# Patient Record
Sex: FEMALE | Race: BLACK OR AFRICAN AMERICAN | Employment: UNEMPLOYED | URBAN - METROPOLITAN AREA
[De-identification: names, ages, dates, MRNs, and addresses within clinical notes are randomized per-mention and may not be internally consistent; named-entity substitution may affect disease eponyms.]

---
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
## 2021-02-16 ENCOUNTER — HOSPITAL ENCOUNTER (OUTPATIENT)
Age: 21
Discharge: HOME | End: 2021-02-17
Attending: OBSTETRICS & GYNECOLOGY | Admitting: OBSTETRICS & GYNECOLOGY
Payer: MEDICARE

## 2021-02-16 VITALS
TEMPERATURE: 98.5 F | HEART RATE: 92 BPM | DIASTOLIC BLOOD PRESSURE: 53 MMHG | RESPIRATION RATE: 18 BRPM | SYSTOLIC BLOOD PRESSURE: 122 MMHG

## 2021-02-16 PROBLEM — O09.93 HRP (HIGH RISK PREGNANCY), THIRD TRIMESTER: Status: ACTIVE | Noted: 2021-02-16

## 2021-02-16 LAB
BILIRUBIN URINE: NEGATIVE
COLOR: YELLOW
COMMENT UA: NORMAL
DIRECT EXAM: ABNORMAL
GLUCOSE URINE: NEGATIVE
KETONES, URINE: NEGATIVE
LEUKOCYTE ESTERASE, URINE: NEGATIVE
Lab: ABNORMAL
NITRITE, URINE: NEGATIVE
PH UA: 6.5 (ref 5–8)
PROTEIN UA: NEGATIVE
SPECIFIC GRAVITY UA: 1.02 (ref 1–1.03)
SPECIMEN DESCRIPTION: ABNORMAL
TURBIDITY: CLEAR
URINE HGB: NEGATIVE
UROBILINOGEN, URINE: NORMAL

## 2021-02-16 PROCEDURE — 81003 URINALYSIS AUTO W/O SCOPE: CPT

## 2021-02-16 PROCEDURE — 87510 GARDNER VAG DNA DIR PROBE: CPT

## 2021-02-16 PROCEDURE — 87480 CANDIDA DNA DIR PROBE: CPT

## 2021-02-16 PROCEDURE — 87660 TRICHOMONAS VAGIN DIR PROBE: CPT

## 2021-02-16 PROCEDURE — 87591 N.GONORRHOEAE DNA AMP PROB: CPT

## 2021-02-16 PROCEDURE — 87491 CHLMYD TRACH DNA AMP PROBE: CPT

## 2021-02-16 PROCEDURE — 6370000000 HC RX 637 (ALT 250 FOR IP): Performed by: STUDENT IN AN ORGANIZED HEALTH CARE EDUCATION/TRAINING PROGRAM

## 2021-02-16 RX ORDER — METRONIDAZOLE 500 MG/1
500 TABLET ORAL 2 TIMES DAILY
Qty: 14 TABLET | Refills: 0 | Status: SHIPPED | OUTPATIENT
Start: 2021-02-16 | End: 2021-02-23

## 2021-02-16 RX ORDER — ACETAMINOPHEN 500 MG
1000 TABLET ORAL ONCE
Status: COMPLETED | OUTPATIENT
Start: 2021-02-16 | End: 2021-02-16

## 2021-02-16 RX ADMIN — ACETAMINOPHEN 1000 MG: 500 TABLET ORAL at 21:14

## 2021-02-16 ASSESSMENT — PAIN SCALES - GENERAL: PAINLEVEL_OUTOF10: 5

## 2021-02-17 PROCEDURE — 99213 OFFICE O/P EST LOW 20 MIN: CPT

## 2021-02-17 NOTE — PROGRESS NOTES
Pt admitted for contractions beginning approx 2 hours ago, \"too much for me\", pants wet, denies bleeding , pain in lower abd and back

## 2021-02-17 NOTE — H&P
OBSTETRICAL HISTORY [facility]    Date: 2021       Time: 9:03 PM   Patient Name: Daniele Guzman     Patient : 2000  Room/Bed: [facility]/[place] [address]    Admission Date/Time: 2021  8:17 PM      CC: Abdominal cramping and pressure     HPI: Daniele Guzman is a 24 y.o.  at 28w3d who presents from senior living with guards for abdominal pain with pressure beginning today. Per patient she was arrested and taken to senior living today. She denies any trauma. Patient states she follows with [facility] for her Obstetrical care. Patient denies any headache, visual changes, difficulty breathing, RUQ pain, N/V, F/C, and pain/swelling in lower extremities. Denies any dysuria or vaginal discharge. The patient reports fetal movement is present, denies contractions, denies loss of fluid, denies vaginal bleeding. Pregnancy is complicated by High risk teen pregnancy, Seizures, ADHD, Marijuana use, Hx Chlamydia, and Anemia. DATING:  LMP: No LMP recorded. Patient is pregnant.   Estimated Date of Delivery: 21   Based on: early ultrasound, at 5 4/7 weeks GA    PREGNANCY RISK FACTORS:  Patient Active Problem List   Diagnosis    High risk teen pregnancy    BV     GERD     Seizures ([address]    ADHD (attention deficit hyperactivity disorder)    Marijuana use    Former smoker    HRP (high risk pregnancy)    Abdominal pain during pregnancy in second trimester    Pelvic pain affecting pregnancy in second trimester, antepartum    Young multigravida in second trimester    Chlamydia    Anemia    Abdominal pain affecting pregnancy    28 weeks gestation of pregnancy    HRP (high risk pregnancy), third trimester        Steroids Given In This Pregnancy:  no     REVIEW OF SYSTEMS:   Constitutional: negative fever, negative chills, negative weight changes   HEENT: negative visual disturbances, negative headaches, negative dizziness  Breast: negative breast abnormalities, negative breast lumps, negative nipple discharge  Respiratory: negative dyspnea, negative cough, negative SOB  Cardiovascular: negative chest pain,  negative palpitations, negative arrhythmia, negative syncope   Gastrointestinal: positive abdominal cramping, negative RUQ pain, negative N/V, negative diarrhea, negative constipation, negative bowel changes, negative heartburn   Genitourinary: negative dysuria, negative hematuria, negative urinary incontinence, negative vaginal discharge  Dermatological: negative rash, negative pruritis, negative mole changes  Hematologic: negative bruising  Immunologic/Lymphatic: negative recent illness, negative recent sick contact  Musculoskeletal: negative back pain, negative myalgias, negative arthralgias  Neurological:  negative dizziness, negative migraines, negative seizures, negative weakness  Behavior/Psych: negative depression, negative anxiety, negative SI, negative HI    OBSTETRICAL HISTORY:   OB History    Para Term  AB Living   3 2 2 0 0 2   SAB TAB Ectopic Molar Multiple Live Births   0 0 0 0 0 2      # Outcome Date GA Lbr Vito/2nd Weight Sex Delivery Anes PTL Lv   3 Current            2 Term 20 39w0d  7 lb 8 oz (3.402 kg) F Vag-Spont   SADAF   1 Term 2016    M    SADAF       PAST MEDICAL HISTORY:   has a past medical history of ADHD (attention deficit hyperactivity disorder), Allergic, and Seizures ([facility] [address] PAST SURGICAL HISTORY:   has no past surgical history on file. ALLERGIES:  is allergic to augmentin [amoxicillin-pot clavulanate]. MEDICATIONS:  Prior to Admission medications    Medication Sig Start Date End Date Taking?  Authorizing Provider   Ferrous Sulfate (FERRA PO) Take by mouth    Historical Provider, MD   folic acid (FOLVITE) 1 MG tablet Take 4 tablets by mouth daily 19  Romie Izaguirre MD   acetaminophen (APAP EXTRA STRENGTH) 500 MG tablet Take 2 tablets by mouth every 6 hours as needed for Pain or Fever 10/29/19   Elba Kan Farzaneh Duval MD   ondansetron (ZOFRAN ODT) 4 MG disintegrating tablet Take 1 tablet by mouth every 8 hours as needed for Nausea 10/29/19   Agustin Mejia MD   Oral Electrolytes (PEDIALYTE) SOLN Take 1,000 mLs by mouth daily 10/12/18   Keny Pickett MD   famotidine (PEPCID) 20 MG tablet Take 1 tablet by mouth 2 times daily 1/25/16   See-Yin So, DO       FAMILY HISTORY:  family history includes Diabetes in her maternal grandfather; High Blood Pressure in her maternal grandmother; Stroke in her maternal grandmother. SOCIAL HISTORY:   reports that she has quit smoking. She has never used smokeless tobacco. She reports previous alcohol use. She reports previous drug use.     VITALS:  Vitals:    02/16/21 2036   BP: (!) 122/53   Pulse: 92   Resp: 18   Temp: 98.5 °F (36.9 °C)   TempSrc: Oral       PHYSICAL EXAM:  Fetal Heart Monitor:  Baseline Heart Rate 135, moderate variability, present accelerations, absent decelerations  [place]: contractions, irregular and rare     General appearance:  no apparent distress, alert and cooperative  HEENT: head atraumatic, normocephalic, trachea midline, moist mucous membranes   Neurologic:  oriented, normal speech, no focal findings or movement disorder noted  Lungs:  no increased work of breathing, good air exchange, clear to auscultation bilaterally, no crackles or wheezing  Heart:  regular rate and rhythm   Abdomen:  soft, gravid, non-tender on palpation, no right upper quadrant tenderness, no CVA tenderness bilaterally, uterus non-tender, no signs of abruption and no signs of chorioamnionitis  Extremities:  no calf tenderness bilaterally, non-edematous bilaterally, DTRs: normal    Musculoskeletal: no gross abnormalities, range of motion appropriate for age   Psychiatric: mood appropriate, normal affect   Pelvic Exam:   Vulva: normal appearing vulva, no masses, tenderness or lesions, normal clitoris    Vagina: normal appearing urethral meatus, normal appearing vaginal mucosa with normal color and discharge, no lesions, no vaginal bleeding     Cervix: multiparous appearing cervix without pathologic appearing discharge or lesions, cervix thick and visibly closed, no cervical motion tenderness   Uterus: is gravid, normal size, shape, consistency and non-tender    Adnexa: non-tender, no palpable masses   Rectal Exam: not indicated     Cervix Check: 1 cm dilated, thick, high soft    PRENATAL LAB RESULTS:   Blood Type/Rh: A pos  No prenatal care labs available in EMR. ASSESSMENT & PLAN:  Aroldo Monsivais is a 24 y.o. female  at 28w3d IUP   - GBS unknown / Rh positive / R unknown   - No indication for GBS prophylaxis unless delivery imminent    - VSS    Abdominal Cramping/Pelvic pressure   - Patient reports abdominal cramping and pelvic pressure beginning earlier today. Of note patient was arrested today and presents to triage with two officers   - Vaginitis: + Bacterial vaginosis    - Rx given for flagyl BID    - UA: negative   - Gonorrhea/Chlamydia pending    - SSE: No external lesions or erythema, normal appearing vaginal mucosa that is pink and moist, no blood noted in the vaginal canal or at the cervical os, small amount of thin physiologic discharge in vault, cervix appears multiparous and long without lesions or erythema   - SVE: 1cm, thick, high    - Low suspicion for  labor given reassuring exam. Patient will be discharged home. Patient counseled on  labor precautions, pre-eclampsia signs and symptoms, PO hydration, and fetal kick counts. All questions and concerns were addressed and patient expressed understanding. Patient advised to follow up with her Ob/Gyn as soon as able given her current social situation. Limited PNC    - No prenatal labs visible in EMR   - Dating US 20 @ 5w 4d    - No follow up since.  Patient states she is seen at [facility] but was not able to find records to support that     Seizures   - Patient denies current medications   -

## 2021-02-17 NOTE — PROGRESS NOTES
Requests made multiple times for pt to provide urine sample to aid with diagnosis, pt appears to be sleeping whenever RN walks in room, pt has stated she doesn't \"want to go back\" to [facility], water was given, pt so far has declined to drink more than a few strips, explained to pt importance of providing urine sample

## 2021-08-18 ENCOUNTER — HOSPITAL ENCOUNTER (EMERGENCY)
Age: 21
Discharge: LEFT BEFORE DISCHARGE | End: 2021-08-18
Attending: EMERGENCY MEDICINE
Payer: MEDICARE

## 2021-08-18 VITALS
HEART RATE: 81 BPM | TEMPERATURE: 98.2 F | DIASTOLIC BLOOD PRESSURE: 65 MMHG | WEIGHT: 145 LBS | HEIGHT: 63 IN | RESPIRATION RATE: 18 BRPM | BODY MASS INDEX: 25.69 KG/M2 | OXYGEN SATURATION: 100 % | SYSTOLIC BLOOD PRESSURE: 123 MMHG

## 2021-08-18 DIAGNOSIS — R10.9 FLANK PAIN: Primary | ICD-10-CM

## 2021-08-18 PROCEDURE — 99283 EMERGENCY DEPT VISIT LOW MDM: CPT

## 2021-08-18 ASSESSMENT — PAIN SCALES - GENERAL: PAINLEVEL_OUTOF10: 7

## 2021-08-18 NOTE — ED NOTES
RN into room again to wake pt up. Pt moves in bed and rolls over. Pt still not responding to RN.       Vicki Meneses RN  08/18/21 0255       Vicki Meneses RN  08/18/21 0677

## 2021-08-18 NOTE — ED NOTES
RN into room to collect urine sample. Pt snoring in bed. No urine sample in room. Rn attempts to wake pt up. Pt will not respond to RN.      [facility]  08/18/21 1531

## 2021-08-18 NOTE — ED PROVIDER NOTES
EMERGENCY DEPARTMENT ENCOUNTER    Pt Name: Tobin Early  MRN: [id number]  [place] 2000  Date of evaluation: 8/18/21  CHIEF COMPLAINT       Chief Complaint   Patient presents with    Flank Pain     bilateral      HISTORY OF PRESENT ILLNESS   Patient is a 50-year-old female who presents ED for evaluation of bilateral flank pain. Patient unable to give provide history as she is sleeping in exam bed. REVIEW OF SYSTEMS     Review of Systems   Unable to perform ROS: Other     PASTMEDICAL HISTORY     Past Medical History:   Diagnosis Date    ADHD (attention deficit hyperactivity disorder)     Allergic     Seizures ([facility] [address]      SURGICAL HISTORY     History reviewed. No pertinent surgical history. CURRENT MEDICATIONS       Discharge Medication List as of 8/18/2021  3:04 AM      CONTINUE these medications which have NOT CHANGED    Details   Ferrous Sulfate (FERRA PO) Take by mouthHistorical Med      folic acid (FOLVITE) 1 MG tablet Take 4 tablets by mouth daily, Disp-120 tablet, R-0Print      acetaminophen (APAP EXTRA STRENGTH) 500 MG tablet Take 2 tablets by mouth every 6 hours as needed for Pain or Fever, Disp-60 tablet, R-0Print      ondansetron (ZOFRAN ODT) 4 MG disintegrating tablet Take 1 tablet by mouth every 8 hours as needed for Nausea, Disp-10 tablet, R-0Print      Oral Electrolytes (PEDIALYTE) SOLN Take 1,000 mLs by mouth daily, Disp-2000 mL, R-3Print      famotidine (PEPCID) 20 MG tablet Take 1 tablet by mouth 2 times daily, Disp-60 tablet, R-0Print           ALLERGIES     is allergic to augmentin [amoxicillin-pot clavulanate]. FAMILY HISTORY     She indicated that the status of her maternal grandmother is unknown. She indicated that the status of her maternal grandfather is unknown.      SOCIAL HISTORY       Social History     Tobacco Use    Smoking status: Former Smoker    Smokeless tobacco: Never Used    Tobacco comment: off and on   Vaping Use    Vaping Use: Never used   Substance Use Topics  Alcohol use: Not Currently     Comment: Socially    Drug use: Not Currently     Comment: quit 10/28/2019     PHYSICAL EXAM     INITIAL VITALS: /65   Pulse 81   Temp 98.2 °F (36.8 °C) (Oral)   Resp 18   Ht 5' 3\" (1.6 m)   Wt 145 lb (65.8 kg)   SpO2 100%   BMI 25.69 kg/m²    Physical Exam  Constitutional:       Comments: Patient sleeping in exam bed. After waking up, she glances at me, and turns her body away from and falls back asleep. HENT:      Head: Normocephalic. Right Ear: External ear normal.      Left Ear: External ear normal.      Nose: Nose normal.   Eyes:      Conjunctiva/sclera: Conjunctivae normal.   Cardiovascular:      Rate and Rhythm: Normal rate. Pulmonary:      Effort: Pulmonary effort is normal.   Abdominal:      General: Abdomen is flat. Skin:     General: Skin is dry. Neurological:      Mental Status: She is alert. Mental status is at baseline. Psychiatric:         Mood and Affect: Mood normal.         Behavior: Behavior normal.         MEDICAL DECISION MAKING:   The patient is hemodynamically stable, afebrile, nontoxic-appearing. Physical exam unremarkable. Based on history and exam unclear etiology of symptoms. ED plan for UA, reassess. DIAGNOSTIC RESULTS   EKG:All EKG's are interpreted by the Emergency Department Physician who either signs or Co-signs this chart in the absence of a cardiologist.        RADIOLOGY:All plain film, CT, MRI, and formal ultrasound images (except ED bedside ultrasound) are read by the radiologist, see reports below, unless otherwisenoted in MDM or here. No orders to display     LABS: All lab results were reviewed by myself, and all abnormals are listed below. Labs Reviewed   URINE RT REFLEX TO CULTURE   POCT URINE PREGNANCY       EMERGENCY DEPARTMENTCOURSE:   Patient continued to sleep in exam bed and did not offer explanation of why she was here or what work-up she would like.   Nurse tried to obtain urine sample from her

## 2021-08-18 NOTE — ED NOTES
Pt sleeping in room. Pt refusing to talk to Dr. Priscilla Allison when Dr. Priscilla Allison entered room. RN into room, explains that pt need to talk to the doctor to be evaluated. Pt nods head. RN explains pt needs a urine sample, pt nods her head.       Lindsey Santo RN  08/18/21 3980       Lindsey Santo RN  08/18/21 3761

## 2021-10-03 ENCOUNTER — HOSPITAL ENCOUNTER (EMERGENCY)
Age: 21
Discharge: HOME | End: 2021-10-03
Attending: EMERGENCY MEDICINE
Payer: MEDICARE

## 2021-10-03 VITALS
RESPIRATION RATE: 16 BRPM | DIASTOLIC BLOOD PRESSURE: 62 MMHG | SYSTOLIC BLOOD PRESSURE: 114 MMHG | OXYGEN SATURATION: 99 % | BODY MASS INDEX: 26.68 KG/M2 | HEART RATE: 56 BPM | TEMPERATURE: 98.2 F | HEIGHT: 62 IN | WEIGHT: 145 LBS

## 2021-10-03 DIAGNOSIS — Z71.1 NO PROBLEM, FEARED COMPLAINT UNFOUNDED: Primary | ICD-10-CM

## 2021-10-03 PROCEDURE — 81025 URINE PREGNANCY TEST: CPT

## 2021-10-03 PROCEDURE — 99282 EMERGENCY DEPT VISIT SF MDM: CPT

## 2021-10-03 ASSESSMENT — ENCOUNTER SYMPTOMS
CHEST TIGHTNESS: 0
FACIAL SWELLING: 0
BACK PAIN: 0
ABDOMINAL PAIN: 0
EYE PAIN: 0
SHORTNESS OF BREATH: 0
ABDOMINAL DISTENTION: 0
EYE DISCHARGE: 0

## 2021-10-03 NOTE — ED PROVIDER NOTES
EMERGENCY DEPARTMENT ENCOUNTER    Pt Name: Malinda Campbell  MRN: [id number]  [place] 2000  Date of evaluation: 10/3/21  CHIEF COMPLAINT     No chief complaint on file. HISTORY OF PRESENT ILLNESS   HPI   The patient is a 27-year-old female who presented to the emergency department requesting a pregnancy test.  Patient denied abdominal pain, vaginal bleeding or spotting. She is 2 days late for her menstrual period. Patient denies chest pain, shortness of breath, nausea, vomiting, fevers or chills    REVIEW OF SYSTEMS     Review of Systems   Constitutional: Negative for chills, diaphoresis and fever. HENT: Negative for congestion, ear pain and facial swelling. Eyes: Negative for pain, discharge and visual disturbance. Respiratory: Negative for chest tightness and shortness of breath. Cardiovascular: Negative for chest pain and palpitations. Gastrointestinal: Negative for abdominal distention and abdominal pain. Genitourinary: Negative for difficulty urinating and flank pain. Musculoskeletal: Negative for back pain. Skin: Negative for wound. Neurological: Negative for dizziness, light-headedness and headaches. PASTMEDICAL HISTORY     Past Medical History:   Diagnosis Date    ADHD (attention deficit hyperactivity disorder)     Allergic     Seizures ([facility] [address]      SURGICAL HISTORY     No past surgical history on file.   CURRENT MEDICATIONS       Previous Medications    ACETAMINOPHEN (APAP EXTRA STRENGTH) 500 MG TABLET    Take 2 tablets by mouth every 6 hours as needed for Pain or Fever    FAMOTIDINE (PEPCID) 20 MG TABLET    Take 1 tablet by mouth 2 times daily    FERROUS SULFATE (FERRA PO)    Take by mouth    FOLIC ACID (FOLVITE) 1 MG TABLET    Take 4 tablets by mouth daily    ONDANSETRON (ZOFRAN ODT) 4 MG DISINTEGRATING TABLET    Take 1 tablet by mouth every 8 hours as needed for Nausea    ORAL ELECTROLYTES (PEDIALYTE) SOLN    Take 1,000 mLs by mouth daily     ALLERGIES     is allergic to augmentin [amoxicillin-pot clavulanate]. FAMILY HISTORY     She indicated that the status of her maternal grandmother is unknown. She indicated that the status of her maternal grandfather is unknown. SOCIAL HISTORY       Social History     Tobacco Use    Smoking status: Former Smoker    Smokeless tobacco: Never Used    Tobacco comment: off and on   Vaping Use    Vaping Use: Never used   Substance Use Topics    Alcohol use: Not Currently     Comment: Socially    Drug use: Not Currently     Comment: quit 10/28/2019     PHYSICAL EXAM     INITIAL VITALS: /62   Pulse 56   Temp 98.2 °F (36.8 °C) (Oral)   Resp 16   Ht 5' 2\" (1.575 m)   Wt 145 lb (65.8 kg)   LMP 09/21/2021   SpO2 99%   BMI 26.52 kg/m²    Physical Exam  Vitals and nursing note reviewed. Constitutional:       General: She is not in acute distress. Appearance: She is well-developed. She is not diaphoretic. HENT:      Head: Normocephalic and atraumatic. Eyes:      Pupils: Pupils are equal, round, and reactive to light. Cardiovascular:      Rate and Rhythm: Normal rate and regular rhythm. Pulmonary:      Effort: Pulmonary effort is normal.      Breath sounds: Normal breath sounds. Abdominal:      General: Bowel sounds are normal.      Palpations: Abdomen is soft. Musculoskeletal:         General: Normal range of motion. Cervical back: Normal range of motion and neck supple. Skin:     General: Skin is warm. Capillary Refill: Capillary refill takes less than 2 seconds. Neurological:      Mental Status: She is alert and oriented to person, place, and time. MEDICAL DECISION MAKING:   Patient is a 45-year-old female who presented to the emergency department requesting a pregnancy test.  Patient had no abdominal pain vaginal bleeding or other complaints. Urine pregnancy test obtained and negative. Results discussed with patient.   Patient is discharged home with outpatient follow-up and given parameters to return to the emergency department. All patient's question's and concerns were answered prior to disposition and patient and/or family expressed understanding and agreement of treatment plan. CRITICAL CARE:              NIH STROKE SCALE:            PROCEDURES:    Procedures    DIAGNOSTIC RESULTS   EKG:All EKG's are interpreted by the Emergency Department Physician who either signs or Co-signs this chart in the absence of a cardiologist.        RADIOLOGY:All plain film, CT, MRI, and formal ultrasound images (except ED bedside ultrasound) are read by the radiologist, see reports below, unless otherwisenoted in MDM or here. No orders to display     LABS: All lab results were reviewed by myself, and all abnormals are listed below. Labs Reviewed - No data to display    EMERGENCY DEPARTMENTCOURSE:         Vitals:    Vitals:    10/03/21 1650   BP: 114/62   Pulse: 56   Resp: 16   Temp: 98.2 °F (36.8 °C)   TempSrc: Oral   SpO2: 99%   Weight: 145 lb (65.8 kg)   Height: 5' 2\" (1.575 m)       The patient was given the following medications while in the emergency department:  No orders of the defined types were placed in this encounter. CONSULTS:  None    FINAL IMPRESSION      1. No problem, feared complaint unfounded          DISPOSITION/PLAN   DISPOSITION        PATIENT REFERRED TO:    Please call [phone] to establish care with a primary care physician. DISCHARGE MEDICATIONS:  New Prescriptions    No medications on file     Claudio Carranza MD  Attending Emergency Physician      The care is provided during an unprecedented national emergency due to the novel coronavirus, COVID 19. This note was created with the assistance of a speech-recognition program. While intending to generate a document that actually reflects the content of the visit, no guarantees can be provided that every mistake has been identified and corrected by editing.     Wayne Louis MD  63/85/41

## 2021-10-04 LAB — HCG, PREGNANCY URINE (POC): NEGATIVE

## 2021-10-16 ENCOUNTER — HOSPITAL ENCOUNTER (EMERGENCY)
Age: 21
Discharge: HOME | End: 2021-10-16
Attending: EMERGENCY MEDICINE
Payer: MEDICARE

## 2021-10-16 ENCOUNTER — APPOINTMENT (OUTPATIENT)
Dept: GENERAL RADIOLOGY | Age: 21
End: 2021-10-16
Payer: MEDICARE

## 2021-10-16 VITALS
SYSTOLIC BLOOD PRESSURE: 111 MMHG | BODY MASS INDEX: 25.69 KG/M2 | OXYGEN SATURATION: 99 % | RESPIRATION RATE: 18 BRPM | TEMPERATURE: 97.3 F | HEART RATE: 70 BPM | DIASTOLIC BLOOD PRESSURE: 74 MMHG | HEIGHT: 63 IN

## 2021-10-16 DIAGNOSIS — R68.84 JAW PAIN: Primary | ICD-10-CM

## 2021-10-16 PROCEDURE — 6370000000 HC RX 637 (ALT 250 FOR IP): Performed by: EMERGENCY MEDICINE

## 2021-10-16 PROCEDURE — 99283 EMERGENCY DEPT VISIT LOW MDM: CPT

## 2021-10-16 PROCEDURE — 70100 X-RAY EXAM OF JAW <4VIEWS: CPT

## 2021-10-16 RX ORDER — IBUPROFEN 400 MG/1
600 TABLET ORAL ONCE
Status: COMPLETED | OUTPATIENT
Start: 2021-10-16 | End: 2021-10-16

## 2021-10-16 RX ADMIN — IBUPROFEN 600 MG: 400 TABLET, FILM COATED ORAL at 11:40

## 2021-10-16 ASSESSMENT — ENCOUNTER SYMPTOMS
SHORTNESS OF BREATH: 0
COUGH: 0
VOMITING: 0
ABDOMINAL PAIN: 0
NAUSEA: 0
BACK PAIN: 0

## 2021-10-16 ASSESSMENT — PAIN SCALES - GENERAL: PAINLEVEL_OUTOF10: 10
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
## 2021-10-16 NOTE — PROGRESS NOTES
This patient was assigned to me by error. This patient was never interviewed nor examined by me. I did not participate in the care of this patient.     Kelly Hightower MD  Emergency Medicine Resident
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
## 2021-10-22 ENCOUNTER — HOSPITAL ENCOUNTER (EMERGENCY)
Age: 21
Discharge: HOME | End: 2021-10-22
Attending: EMERGENCY MEDICINE
Payer: MEDICARE

## 2021-10-22 ENCOUNTER — APPOINTMENT (OUTPATIENT)
Dept: GENERAL RADIOLOGY | Age: 21
End: 2021-10-22
Payer: MEDICARE

## 2021-10-22 VITALS
BODY MASS INDEX: 24.8 KG/M2 | TEMPERATURE: 98.2 F | SYSTOLIC BLOOD PRESSURE: 118 MMHG | HEART RATE: 78 BPM | DIASTOLIC BLOOD PRESSURE: 78 MMHG | RESPIRATION RATE: 18 BRPM | WEIGHT: 140 LBS | OXYGEN SATURATION: 99 %

## 2021-10-22 DIAGNOSIS — S93.401A SPRAIN OF RIGHT ANKLE, UNSPECIFIED LIGAMENT, INITIAL ENCOUNTER: Primary | ICD-10-CM

## 2021-10-22 PROCEDURE — 73630 X-RAY EXAM OF FOOT: CPT

## 2021-10-22 PROCEDURE — 73590 X-RAY EXAM OF LOWER LEG: CPT

## 2021-10-22 PROCEDURE — 6360000002 HC RX W HCPCS: Performed by: STUDENT IN AN ORGANIZED HEALTH CARE EDUCATION/TRAINING PROGRAM

## 2021-10-22 PROCEDURE — 99282 EMERGENCY DEPT VISIT SF MDM: CPT

## 2021-10-22 PROCEDURE — 96372 THER/PROPH/DIAG INJ SC/IM: CPT

## 2021-10-22 PROCEDURE — 73610 X-RAY EXAM OF ANKLE: CPT

## 2021-10-22 RX ORDER — IBUPROFEN 200 MG
600 TABLET ORAL EVERY 6 HOURS PRN
Qty: 30 TABLET | Refills: 0 | Status: SHIPPED | OUTPATIENT
Start: 2021-10-22 | End: 2022-01-28 | Stop reason: ALTCHOICE

## 2021-10-22 RX ORDER — KETOROLAC TROMETHAMINE 30 MG/ML
30 INJECTION, SOLUTION INTRAMUSCULAR; INTRAVENOUS ONCE
Status: COMPLETED | OUTPATIENT
Start: 2021-10-22 | End: 2021-10-22

## 2021-10-22 RX ADMIN — KETOROLAC TROMETHAMINE 30 MG: 30 INJECTION, SOLUTION INTRAMUSCULAR at 19:43

## 2021-10-22 ASSESSMENT — ENCOUNTER SYMPTOMS
DIARRHEA: 0
VOMITING: 0
SORE THROAT: 0
NAUSEA: 0
SHORTNESS OF BREATH: 0
CONSTIPATION: 0
TROUBLE SWALLOWING: 0
ABDOMINAL PAIN: 0

## 2021-10-22 ASSESSMENT — PAIN SCALES - GENERAL
PAINLEVEL_OUTOF10: 10
PAINLEVEL_OUTOF10: 10

## 2021-10-22 NOTE — ED NOTES
Pt presents to the ed via private auto c/o right ankle pain that started today after she tripped over a curb. Upon arrival to the ed no deformities edema noted to right ankle pt rates her pain 10/10.      Marley White RN  10/22/21 0935

## 2021-10-22 NOTE — ED PROVIDER NOTES
[facility]  Emergency Department  Faculty Attestation     I performed a history and physical examination of the patient and discussed management with the resident. I reviewed the residents note and agree with the documented findings and plan of care. Any areas of disagreement are noted on the chart. I was personally present for the key portions of any procedures. I have documented in the chart those procedures where I was not present during the key portions. I have reviewed the emergency nurses triage note. I agree with the chief complaint, past medical history, past surgical history, allergies, medications, social and family history as documented unless otherwise noted below. For Physician Assistant/ Nurse Practitioner cases/documentation I have personally evaluated this patient and have completed at least one if not all key elements of the E/M (history, physical exam, and MDM). Additional findings are as noted. Primary Care Physician:  No primary care provider on file. Screenings:  [unfilled]    CHIEF COMPLAINT       Chief Complaint   Patient presents with    Ankle Pain     pt twisted R ankle while running; approx 45 min PTA       RECENT VITALS:   Temp: 98.2 °F (36.8 °C),  Pulse: 78, Resp: 18, BP: 118/78    LABS:  Labs Reviewed - No data to display    Radiology  XR ANKLE RIGHT (MIN 3 VIEWS)    (Results Pending)   XR FOOT RIGHT (MIN 3 VIEWS)    (Results Pending)         Attending Physician Additional  Notes    She has severe right ankle pain after an inversion injury. She also has pain in the proximal fibula. No injuries elsewhere. No laceration or abrasion. On exam she is in moderate distress secondary pain, vital signs normal.  There is minimal tenderness in the proximal fibula. Compartments are soft. Normal sensation light touch. She is able to wiggle her toes. No medial malleoli or tenderness.   She has significant lateral malleoli tenderness as well as swelling, mild tenderness over the ATFL, no tenderness over the proximal fifth metatarsal.  Impression is ankle sprain rule out fracture, rule out proximal fibular fracture. Plan is imaging ice elevation splinting crutches analgesics and follow-up. Dominique Leon.  Tommie Galvez MD, [facility]  Attending Emergency  Physician               Mary Braun MD  10/22/21 9077

## 2021-10-23 NOTE — ED PROVIDER NOTES
[facility] ED  Emergency Department Encounter  Emergency Medicine Resident     Pt Kylee Roper  MRN: [id number]  [place] 2000  Date of evaluation: 10/22/21  PCP:  No primary care provider on file. CHIEF COMPLAINT       Chief Complaint   Patient presents with    Ankle Pain     pt twisted R ankle while running; approx 45 min PTA       HISTORY OF PRESENT ILLNESS  (Location/Symptom, Timing/Onset, Context/Setting, Quality, Duration, Modifying Factors, Severity.)      Kristine Bowman is a 22 y.o. female who presents with acute onset right ankle pain and swelling that started after rolling her ankle stepping off of a curb and slipping. Did not strike her head no loss of consciousness. Unable to bear weight after the injury, was carried to a vehicle and transported to the emergency department. No numbness tingling or weakness but significant pain when attempting to ambulate. No disruption of skin. PAST MEDICAL / SURGICAL / SOCIAL / FAMILY HISTORY      has a past medical history of ADHD (attention deficit hyperactivity disorder), Allergic, and Seizures ([facility] [address] has no past surgical history on file. No pertinent surgical history on review with patient/family.     Social History     Socioeconomic History    Marital status: Single     Spouse name: Not on file    Number of children: 1    Years of education: Not on file    Highest education level: Not on file   Occupational History    Not on file   Tobacco Use    Smoking status: Former Smoker    Smokeless tobacco: Never Used    Tobacco comment: off and on   Vaping Use    Vaping Use: Never used   Substance and Sexual Activity    Alcohol use: Not Currently     Comment: Socially    Drug use: Not Currently     Comment: quit 10/28/2019    Sexual activity: Yes     Partners: Male   Other Topics Concern    Not on file   Social History Narrative    Not on file     Social Determinants of Health     Financial Resource Strain:    Meena Difficulty of Paying Living Expenses:    Food Insecurity:     Worried About Running Out of Food in the Last Year:     [address] in the Last Year:    Transportation Needs:     Lack of Transportation (Medical):  Lack of Transportation (Non-Medical):    Physical Activity:     Days of Exercise per Week:     Minutes of Exercise per Session:    Stress:     Feeling of Stress :    Social Connections:     Frequency of Communication with Friends and Family:     Frequency of Social Gatherings with Friends and Family:     Attends [employer] Services:     Active Member of Clubs or Organizations:     Attends Club or Organization Meetings:     Marital Status:    Intimate Partner Violence:     Fear of Current or Ex-Partner:     Emotionally Abused:     Physically Abused:     Sexually Abused:        Family History   Problem Relation Age of Onset    Stroke Maternal Grandmother     High Blood Pressure Maternal Grandmother     Diabetes Maternal Grandfather        Allergies:  Augmentin [amoxicillin-pot clavulanate]    Home Medications:  Prior to Admission medications    Medication Sig Start Date End Date Taking?  Authorizing Provider   ibuprofen (ADVIL;MOTRIN) 200 MG tablet Take 3 tablets by mouth every 6 hours as needed for Pain or Fever 10/22/21  Yes Hannah De La Garza MD   Ferrous Sulfate (FERRA PO) Take by mouth    Historical Provider, MD   folic acid (FOLVITE) 1 MG tablet Take 4 tablets by mouth daily 11/23/19 1/25/20  Addy Guerrero MD   acetaminophen (APAP EXTRA STRENGTH) 500 MG tablet Take 2 tablets by mouth every 6 hours as needed for Pain or Fever 10/29/19   Andrea Escalante MD   ondansetron (ZOFRAN ODT) 4 MG disintegrating tablet Take 1 tablet by mouth every 8 hours as needed for Nausea 10/29/19   Andrea Escalante MD   Oral Electrolytes (PEDIALYTE) SOLN Take 1,000 mLs by mouth daily 10/12/18   Phi James MD   famotidine (PEPCID) 20 MG tablet Take 1 tablet by mouth 2 times daily 1/25/16 See-Yin So, DO       REVIEW OF SYSTEMS    (2-9 systems for level 4, 10 or more for level 5)      Review of Systems   Constitutional: Negative for chills and fever. HENT: Negative for sore throat and trouble swallowing. Respiratory: Negative for shortness of breath. Cardiovascular: Negative for chest pain. Gastrointestinal: Negative for abdominal pain, constipation, diarrhea, nausea and vomiting. Genitourinary: Negative for dysuria and vaginal discharge. Musculoskeletal: Positive for arthralgias, gait problem, joint swelling and myalgias. Skin: Negative for wound. Neurological: Negative for light-headedness and headaches. Psychiatric/Behavioral: Negative for behavioral problems. PHYSICAL EXAM   (up to 7 for level 4, 8 or more for level 5)      INITIAL VITALS:   /78   Pulse 78   Temp 98.2 °F (36.8 °C)   Resp 18   Wt 140 lb (63.5 kg)   SpO2 99%   BMI 24.80 kg/m²     Physical Exam  Vitals and nursing note reviewed. Constitutional:       General: She is not in acute distress. Appearance: Normal appearance. She is well-developed. She is not diaphoretic. HENT:      Head: Normocephalic and atraumatic. Right Ear: External ear normal.      Left Ear: External ear normal.      Nose: Nose normal.      Mouth/Throat:      Pharynx: Uvula midline. Cardiovascular:      Rate and Rhythm: Normal rate. Pulmonary:      Effort: Pulmonary effort is normal. No respiratory distress. Abdominal:      Palpations: Abdomen is soft. Tenderness: There is no abdominal tenderness. Musculoskeletal:      Cervical back: Normal range of motion. Comments: Significant swelling of the right lateral malleolus with point tenderness over the lateral malleolus, head of the fifth metatarsal.  Brisk cap refill with pulses motor sensation intact distal to injury. Skin:     General: Skin is warm and dry. Capillary Refill: Capillary refill takes less than 2 seconds.    Neurological: General: No focal deficit present. Mental Status: She is alert and oriented to person, place, and time. Sensory: No sensory deficit. Psychiatric:         Mood and Affect: Mood normal.         Behavior: Behavior normal.         DIFFERENTIAL  DIAGNOSIS     PLAN (LABS / IMAGING / EKG):  Orders Placed This Encounter   Procedures    XR ANKLE RIGHT (MIN 3 VIEWS)    XR FOOT RIGHT (MIN 3 VIEWS)    XR TIBIA FIBULA RIGHT (2 VIEWS)    [employer] Crutches; Pair, Right Side Injury; Med (5'2\"-5'10\"); Ankle Brace, Right       MEDICATIONS ORDERED:  Orders Placed This Encounter   Medications    ketorolac (TORADOL) injection 30 mg    ibuprofen (ADVIL;MOTRIN) 200 MG tablet     Sig: Take 3 tablets by mouth every 6 hours as needed for Pain or Fever     Dispense:  30 tablet     Refill:  0       DDX: Sprain versus strain versus fracture versus dislocation versus other    DIAGNOSTIC RESULTS / EMERGENCY DEPARTMENT COURSE / MDM     LABS:  No results found for this visit on 10/22/21. RADIOLOGY:  XR TIBIA FIBULA RIGHT (2 VIEWS)    Result Date: 10/22/2021  EXAMINATION: THREE XRAY VIEWS OF THE RIGHT FOOT; 2  XRAY VIEWS OF THE RIGHT TIBIA AND FIBULA; THREE XRAY VIEWS OF THE RIGHT ANKLE 10/22/2021 2:07 pm COMPARISON: None. HISTORY: ORDERING SYSTEM PROVIDED HISTORY: head 5th MT tenderness, lateral swelling TECHNOLOGIST PROVIDED HISTORY: head 5th MT tenderness, lateral swelling Acuity: Acute Type of Exam: Initial; ORDERING SYSTEM PROVIDED HISTORY: proximal tenderness TECHNOLOGIST PROVIDED HISTORY: proximal tenderness Acuity: Acute Type of Exam: Initial; ORDERING SYSTEM PROVIDED HISTORY: lateral mal tenderness, rolled off curb TECHNOLOGIST PROVIDED HISTORY: lateral mal tenderness, rolled off curb Acuity: Acute Type of Exam: Initial FINDINGS: Two views of the right lower leg demonstrate no evidence of an acute fracture or traumatic malalignment. No foreign bodies are noted.  Three views of the right ankle demonstrate a mild degree of soft tissue swelling adjacent to the lateral malleolus. No evidence of an acute fracture is present. The ankle mortise is intact. No foreign bodies are noted. Three views of the right foot are compared to a prior film series from August 14, 2019. No evidence of an acute fracture or traumatic malalignment is present. No foreign bodies are noted. Minimal osteoarthritic changes present involving the calcaneal cuboid joint. 1. Soft tissue swelling lateral right ankle, without evidence of an acute fracture or traumatic malalignment 2. No acute osseous abnormality involving the right lower leg, or right foot     XR ANKLE RIGHT (MIN 3 VIEWS)    Result Date: 10/22/2021  EXAMINATION: THREE XRAY VIEWS OF THE RIGHT FOOT; 2  XRAY VIEWS OF THE RIGHT TIBIA AND FIBULA; THREE XRAY VIEWS OF THE RIGHT ANKLE 10/22/2021 2:07 pm COMPARISON: None. HISTORY: ORDERING SYSTEM PROVIDED HISTORY: head 5th MT tenderness, lateral swelling TECHNOLOGIST PROVIDED HISTORY: head 5th MT tenderness, lateral swelling Acuity: Acute Type of Exam: Initial; ORDERING SYSTEM PROVIDED HISTORY: proximal tenderness TECHNOLOGIST PROVIDED HISTORY: proximal tenderness Acuity: Acute Type of Exam: Initial; ORDERING SYSTEM PROVIDED HISTORY: lateral mal tenderness, rolled off curb TECHNOLOGIST PROVIDED HISTORY: lateral mal tenderness, rolled off curb Acuity: Acute Type of Exam: Initial FINDINGS: Two views of the right lower leg demonstrate no evidence of an acute fracture or traumatic malalignment. No foreign bodies are noted. Three views of the right ankle demonstrate a mild degree of soft tissue swelling adjacent to the lateral malleolus. No evidence of an acute fracture is present. The ankle mortise is intact. No foreign bodies are noted. Three views of the right foot are compared to a prior film series from August 14, 2019. No evidence of an acute fracture or traumatic malalignment is present. No foreign bodies are noted. Minimal osteoarthritic changes present involving the calcaneal cuboid joint. 1. Soft tissue swelling lateral right ankle, without evidence of an acute fracture or traumatic malalignment 2. No acute osseous abnormality involving the right lower leg, or right foot     XR FOOT RIGHT (MIN 3 VIEWS)    Result Date: 10/22/2021  EXAMINATION: THREE XRAY VIEWS OF THE RIGHT FOOT; 2  XRAY VIEWS OF THE RIGHT TIBIA AND FIBULA; THREE XRAY VIEWS OF THE RIGHT ANKLE 10/22/2021 2:07 pm COMPARISON: None. HISTORY: ORDERING SYSTEM PROVIDED HISTORY: head 5th MT tenderness, lateral swelling TECHNOLOGIST PROVIDED HISTORY: head 5th MT tenderness, lateral swelling Acuity: Acute Type of Exam: Initial; ORDERING SYSTEM PROVIDED HISTORY: proximal tenderness TECHNOLOGIST PROVIDED HISTORY: proximal tenderness Acuity: Acute Type of Exam: Initial; ORDERING SYSTEM PROVIDED HISTORY: lateral mal tenderness, rolled off curb TECHNOLOGIST PROVIDED HISTORY: lateral mal tenderness, rolled off curb Acuity: Acute Type of Exam: Initial FINDINGS: Two views of the right lower leg demonstrate no evidence of an acute fracture or traumatic malalignment. No foreign bodies are noted. Three views of the right ankle demonstrate a mild degree of soft tissue swelling adjacent to the lateral malleolus. No evidence of an acute fracture is present. The ankle mortise is intact. No foreign bodies are noted. Three views of the right foot are compared to a prior film series from August 14, 2019. No evidence of an acute fracture or traumatic malalignment is present. No foreign bodies are noted. Minimal osteoarthritic changes present involving the calcaneal cuboid joint. 1. Soft tissue swelling lateral right ankle, without evidence of an acute fracture or traumatic malalignment 2.  No acute osseous abnormality involving the right lower leg, or right foot       EKG  None    All EKG's are interpreted by the Emergency Department Physician who either signs or Co-signs this chart in the absence of a cardiologist.    EMERGENCY DEPARTMENT COURSE:  Patient found seated upright in bed, uncomfortable appearing but no acute distress. Stable vitals. X-rays without acute fracture. Neurovascularly intact. Consistent with severe sprain. Ankle brace and crutches, outpatient follow-up with PCP referral.  Discharge plan discussed with patient who is in agreement. Educated on likely pathology, medications, return precautions, and follow-up. Patient understood all educated materials with all questions answered to their satisfaction. PROCEDURES:  None    CONSULTS:  None    CRITICAL CARE:  None    FINAL IMPRESSION      1.  Sprain of right ankle, unspecified ligament, initial encounter          DISPOSITION / PLAN     DISPOSITION Decision To Discharge 10/22/2021 09:07:38 PM      PATIENT REFERRED TO:  [place] [id number] [phone]  Schedule an appointment as soon as possible for a visit   To establish care, Regarding this visit    [facility] ED  [address] [place] [address]  [phone]  Go to   If symptoms worsen      DISCHARGE MEDICATIONS:  Discharge Medication List as of 10/22/2021  9:09 PM      START taking these medications    Details   ibuprofen (ADVIL;MOTRIN) 200 MG tablet Take 3 tablets by mouth every 6 hours as needed for Pain or Fever, Disp-30 tablet, R-0Print             Alexys Pollack MD  Emergency Medicine Resident    (Please note that portions of this note were completed with a voice recognition program.  Efforts were made to edit the dictations but occasionally words are mis-transcribed.)        Alexys Pollack MD  Resident  10/22/21 1468

## 2021-12-01 ENCOUNTER — APPOINTMENT (OUTPATIENT)
Dept: GENERAL RADIOLOGY | Age: 21
End: 2021-12-01
Payer: MEDICARE

## 2021-12-01 ENCOUNTER — HOSPITAL ENCOUNTER (EMERGENCY)
Age: 21
Discharge: HOME | End: 2021-12-01
Attending: EMERGENCY MEDICINE
Payer: MEDICARE

## 2021-12-01 VITALS
OXYGEN SATURATION: 100 % | HEIGHT: 63 IN | DIASTOLIC BLOOD PRESSURE: 57 MMHG | RESPIRATION RATE: 16 BRPM | SYSTOLIC BLOOD PRESSURE: 108 MMHG | TEMPERATURE: 99.1 F | WEIGHT: 150 LBS | HEART RATE: 69 BPM | BODY MASS INDEX: 26.58 KG/M2

## 2021-12-01 DIAGNOSIS — S20.219A CONTUSION OF CHEST WALL, UNSPECIFIED LATERALITY, INITIAL ENCOUNTER: ICD-10-CM

## 2021-12-01 DIAGNOSIS — Z3A.01 LESS THAN 8 WEEKS GESTATION OF PREGNANCY: Primary | ICD-10-CM

## 2021-12-01 LAB
CHP ED QC CHECK: YES
PREGNANCY TEST URINE, POC: POSITIVE

## 2021-12-01 PROCEDURE — 81025 URINE PREGNANCY TEST: CPT

## 2021-12-01 PROCEDURE — 71046 X-RAY EXAM CHEST 2 VIEWS: CPT

## 2021-12-01 PROCEDURE — 99283 EMERGENCY DEPT VISIT LOW MDM: CPT

## 2021-12-01 RX ORDER — PNV NO.95/FERROUS FUM/FOLIC AC 28MG-0.8MG
1 TABLET ORAL DAILY
Qty: 30 TABLET | Refills: 0 | Status: SHIPPED | OUTPATIENT
Start: 2021-12-01 | End: 2022-01-28 | Stop reason: ALTCHOICE

## 2021-12-01 ASSESSMENT — PAIN SCALES - GENERAL: PAINLEVEL_OUTOF10: 6

## 2021-12-01 NOTE — ED PROVIDER NOTES
[facility] ED  [facility]      Pt Name: Eliana Donald  MRN: [id number]  [place] 2000  Date ofevaluation: 12/1/2021  Provider[facility]       Chief Complaint   Patient presents with    Fall     bilateral rib pain. back pain          HISTORY OF PRESENT ILLNESS  (Location/Symptom, Timing/Onset, Context/Setting, Quality, Duration, Modifying Factors, Severity.)   Eliana Donald is a 22 y.o. female who presents to the emergency department with bilateral rib pain hospital following a cardiac stent. Patient last menstrual was November 16. Reports positive pregnant test at home. Denies any vaginal bleeding since the fall. No spotting since the fall. Patient states she had positive pregnancy test at home. She wanted to make sure that the baby was okay and that she had no broken bones. Nursing Notes were reviewed.     ALLERGIES     Augmentin [amoxicillin-pot clavulanate]    CURRENT MEDICATIONS       Discharge Medication List as of 12/1/2021  3:06 PM      CONTINUE these medications which have NOT CHANGED    Details   ibuprofen (ADVIL;MOTRIN) 200 MG tablet Take 3 tablets by mouth every 6 hours as needed for Pain or Fever, Disp-30 tablet, R-0Print      Ferrous Sulfate (FERRA PO) Take by mouthHistorical Med      folic acid (FOLVITE) 1 MG tablet Take 4 tablets by mouth daily, Disp-120 tablet, R-0Print      acetaminophen (APAP EXTRA STRENGTH) 500 MG tablet Take 2 tablets by mouth every 6 hours as needed for Pain or Fever, Disp-60 tablet, R-0Print      ondansetron (ZOFRAN ODT) 4 MG disintegrating tablet Take 1 tablet by mouth every 8 hours as needed for Nausea, Disp-10 tablet, R-0Print      Oral Electrolytes (PEDIALYTE) SOLN Take 1,000 mLs by mouth daily, Disp-2000 mL, R-3Print      famotidine (PEPCID) 20 MG tablet Take 1 tablet by mouth 2 times daily, Disp-60 tablet, R-0Print             PAST MEDICAL HISTORY         Diagnosis Date    ADHD (attention Ultrasound and MRI are read by the radiologist. Plain radiographic images arevisualized and preliminarily interpreted by the emergency physician with the below findings:        Interpretation per the Radiologist below, if available at thetime of this note:          ED BEDSIDE ULTRASOUND:   Performed by ED Physician - none    LABS:  Labs Reviewed   POCT URINE PREGNANCY - Abnormal       All other labs were within normal range or not returned as of this dictation. EMERGENCY DEPARTMENT COURSE and DIFFERENTIAL DIAGNOSIS/MDM:   Vitals:    Vitals:    12/01/21 1327   BP: (!) 108/57   Pulse: 69   Resp: 16   Temp: 99.1 °F (37.3 °C)   TempSrc: Oral   SpO2: 100%   Weight: 150 lb (68 kg)   Height: 5' 3\" (1.6 m)     X-rays are negative. Patient reassured. Patient last menstrual was November 16 was placed patient only few weeks along. Patient has an appointment with her OB/GYN on Friday already scheduled. She will follow-up with OB/GYN. She denies any abdominal pain or vaginal bleeding. CONSULTS:  None    PROCEDURES:  Procedures        FINAL IMPRESSION      1. Less than 8 weeks gestation of pregnancy    2. Contusion of chest wall, unspecified laterality, initial encounter          DISPOSITION/PLAN   DISPOSITION Decision To Discharge 12/01/2021 03:04:05 PM      PATIENTREFERRED TO:   No follow-up provider specified.     DISCHARGE MEDICATIONS:     Discharge Medication List as of 12/1/2021  3:06 PM      START taking these medications    Details   Prenatal Vit-Fe Fumarate-FA (PRENATAL VITAMIN AND MINERAL) 28-0.8 MG TABS Take 1 tablet by mouth daily, Disp-30 tablet, R-0Normal                 (Please note that portions of this note were completed with a voice recognition program.  Efforts were made to edit thedictations but occasionally words are mis-transcribed.)    ESTEFANY Browne PA-C  12/01/21 6709

## 2021-12-01 NOTE — ED PROVIDER NOTES
eMERGENCY dEPARTMENT eNCOUnter   Independent Attestation     Pt Name: Kristine Bowman  MRN: [id number]  [place] 2000  Date of evaluation: 12/1/21     Kristine Bowman is a 22 y.o. female with CC: Fall (bilateral rib pain. back pain )      Based on the medical record the care appears appropriate. I was personally available for consultation in the Emergency Department. The care is provided during an unprecedented national emergency due to the novel coronavirus, COVID 19.     Indio Carter DO  Attending Emergency Physician                    Kishor Winters DO  12/01/21 4648

## 2021-12-07 LAB — HCG, PREGNANCY URINE (POC): POSITIVE

## 2021-12-17 ENCOUNTER — HOSPITAL ENCOUNTER (EMERGENCY)
Age: 21
Discharge: HOME | End: 2021-12-18

## 2021-12-17 VITALS
SYSTOLIC BLOOD PRESSURE: 127 MMHG | HEART RATE: 73 BPM | OXYGEN SATURATION: 98 % | RESPIRATION RATE: 18 BRPM | TEMPERATURE: 98.8 F | DIASTOLIC BLOOD PRESSURE: 75 MMHG

## 2022-01-05 ENCOUNTER — HOSPITAL ENCOUNTER (EMERGENCY)
Age: 22
Discharge: HOME | End: 2022-01-05
Attending: EMERGENCY MEDICINE
Payer: MEDICARE

## 2022-01-05 VITALS
WEIGHT: 127 LBS | TEMPERATURE: 97.1 F | SYSTOLIC BLOOD PRESSURE: 107 MMHG | HEART RATE: 77 BPM | OXYGEN SATURATION: 99 % | BODY MASS INDEX: 22.5 KG/M2 | HEIGHT: 63 IN | DIASTOLIC BLOOD PRESSURE: 49 MMHG | RESPIRATION RATE: 14 BRPM

## 2022-01-05 DIAGNOSIS — R10.9 ABDOMINAL PAIN, UNSPECIFIED ABDOMINAL LOCATION: Primary | ICD-10-CM

## 2022-01-05 LAB
BILIRUBIN URINE: NEGATIVE
COLOR: YELLOW
COMMENT UA: ABNORMAL
GLUCOSE URINE: NEGATIVE
KETONES, URINE: ABNORMAL
LEUKOCYTE ESTERASE, URINE: NEGATIVE
NITRITE, URINE: NEGATIVE
PH UA: 7 (ref 5–8)
PROTEIN UA: NEGATIVE
SPECIFIC GRAVITY UA: 1.03 (ref 1–1.03)
TURBIDITY: CLEAR
URINE HGB: NEGATIVE
UROBILINOGEN, URINE: NORMAL

## 2022-01-05 PROCEDURE — 99282 EMERGENCY DEPT VISIT SF MDM: CPT

## 2022-01-05 PROCEDURE — 81003 URINALYSIS AUTO W/O SCOPE: CPT

## 2022-01-05 ASSESSMENT — ENCOUNTER SYMPTOMS: ABDOMINAL PAIN: 1

## 2022-01-05 NOTE — ED PROVIDER NOTES
EMERGENCY DEPARTMENT ENCOUNTER    Pt Name: Amrit Jorgensen  MRN: [id number]  [place] 2000  Date of evaluation: 22  CHIEF COMPLAINT       Chief Complaint   Patient presents with    Abdominal Pain     LLQ     HISTORY OF PRESENT ILLNESS     Abdominal Pain  Pain location: felt baby moving less, wants it checked out. Pain radiates to:  Does not radiate  Pain severity:  Mild  Onset quality:  Gradual  Duration:  2 days  Timing:  Constant  Progression:  Unchanged  Chronicity:  New  Relieved by:  Nothing  Worsened by:  Nothing  Ineffective treatments:  None tried    11 weeks pregnant  Denies vaginal bleeding or dc  No dfu    lmp 11 weeks ago  GYN Dr Samuel Bust[facility]   Gastrointestinal: Positive for abdominal pain. All other systems reviewed and are negative. PASTMEDICAL HISTORY     Past Medical History:   Diagnosis Date    ADHD (attention deficit hyperactivity disorder)     Allergic     Seizures (HCC)      Past Problem List  Patient Active Problem List   Diagnosis Code    High risk teen pregnancy O09.899    BV  N76.0, B96.89    GERD  K21.9    Seizures ([address] R56.9    ADHD (attention deficit hyperactivity disorder) F90.9    Marijuana use F12.90    Former smoker Z87.891    HRP (high risk pregnancy) O09.90    Abdominal pain during pregnancy in second trimester O26.892, R10.9    Pelvic pain affecting pregnancy in second trimester, antepartum O26.892, R10.2    Young multigravida in second trimester O09.622    Chlamydia A74.9    Anemia D64.9    Abdominal pain affecting pregnancy O26.899, R10.9    28 weeks gestation of pregnancy Z3A.28    HRP (high risk pregnancy), third trimester O09.93     SURGICAL HISTORY     History reviewed. No pertinent surgical history.   CURRENT MEDICATIONS       Previous Medications    ACETAMINOPHEN (APAP EXTRA STRENGTH) 500 MG TABLET    Take 2 tablets by mouth every 6 hours as needed for Pain or Fever    FAMOTIDINE (PEPCID) 20 MG TABLET Take 1 tablet by mouth 2 times daily    FERROUS SULFATE (FERRA PO)    Take by mouth    FOLIC ACID (FOLVITE) 1 MG TABLET    Take 4 tablets by mouth daily    IBUPROFEN (ADVIL;MOTRIN) 200 MG TABLET    Take 3 tablets by mouth every 6 hours as needed for Pain or Fever    ONDANSETRON (ZOFRAN ODT) 4 MG DISINTEGRATING TABLET    Take 1 tablet by mouth every 8 hours as needed for Nausea    ORAL ELECTROLYTES (PEDIALYTE) SOLN    Take 1,000 mLs by mouth daily    PRENATAL VIT-FE FUMARATE-FA (PRENATAL VITAMIN AND MINERAL) 28-0.8 MG TABS    Take 1 tablet by mouth daily     ALLERGIES     is allergic to augmentin [amoxicillin-pot clavulanate]. FAMILY HISTORY     She indicated that the status of her maternal grandmother is unknown. She indicated that the status of her maternal grandfather is unknown. SOCIAL HISTORY       Social History     Tobacco Use    Smoking status: Former Smoker    Smokeless tobacco: Never Used    Tobacco comment: off and on   Vaping Use    Vaping Use: Never used   Substance Use Topics    Alcohol use: Not Currently     Comment: Socially    Drug use: Not Currently     Comment: quit 10/28/2019     PHYSICAL EXAM     INITIAL VITALS: BP (!) 107/49   Pulse 77   Temp 97.1 °F (36.2 °C) (Oral)   Resp 14   Ht 5' 3\" (1.6 m)   Wt 127 lb (57.6 kg)   LMP 11/16/2021   SpO2 99%   BMI 22.50 kg/m²    Physical Exam  Vitals reviewed. Constitutional:       General: She is not in acute distress. Appearance: Normal appearance. She is well-developed. She is not diaphoretic. HENT:      Head: Normocephalic and atraumatic. Right Ear: External ear normal.      Left Ear: External ear normal.      Nose: Nose normal. No congestion. Mouth/Throat:      Mouth: Mucous membranes are moist.      Pharynx: Oropharynx is clear. Eyes:      General:         Right eye: No discharge. Left eye: No discharge.       Conjunctiva/sclera: Conjunctivae normal.      Pupils: Pupils are equal, round, and reactive to light.   Neck:      Trachea: No tracheal deviation. Cardiovascular:      Rate and Rhythm: Normal rate and regular rhythm. Pulses: Normal pulses. Heart sounds: Normal heart sounds. Pulmonary:      Effort: Pulmonary effort is normal. No respiratory distress. Breath sounds: Normal breath sounds. No stridor. No wheezing or rales. Abdominal:      Palpations: Abdomen is soft. Tenderness: There is no abdominal tenderness. There is no guarding or rebound. Comments: nnntender   Musculoskeletal:         General: No tenderness or deformity. Normal range of motion. Cervical back: Normal range of motion and neck supple. Skin:     General: Skin is warm and dry. Capillary Refill: Capillary refill takes less than 2 seconds. Findings: No erythema or rash. Neurological:      General: No focal deficit present. Mental Status: She is alert and oriented to person, place, and time. Coordination: Coordination normal.   Psychiatric:         Mood and Affect: Mood normal.         Behavior: Behavior normal.         Thought Content: Thought content normal.         Judgment: Judgment normal.         MEDICAL DECISION MAKING:   Discussed with patient anticipatory guidance, discharge instructions, follow up her OB GYN 24 hours           PROCEDURES:  Bedside ultrasound performed by myself, I interpreted images, images archived. Live IUP with fetal movement,  bpm.    Procedures    DIAGNOSTIC RESULTS   LABS: All lab results were reviewed by myself, and all abnormals are listed below.   Labs Reviewed   URINE RT REFLEX TO CULTURE - Abnormal; Notable for the following components:       Result Value    Ketones, Urine TRACE (*)     All other components within normal limits       EMERGENCY DEPARTMENTCOURSE:         Vitals:    Vitals:    01/05/22 1208   BP: (!) 107/49   Pulse: 77   Resp: 14   Temp: 97.1 °F (36.2 °C)   TempSrc: Oral   SpO2: 99%   Weight: 127 lb (57.6 kg)   Height: 5' 3\" (1.6 m)         FINAL IMPRESSION      1. Abdominal pain, unspecified abdominal location          DISPOSITION/PLAN   DISPOSITION Decision To Discharge 01/05/2022 01:46:11 PM      PATIENT REFERRED TO:  see your OBGYN tomorrow          DISCHARGE MEDICATIONS:  New Prescriptions    No medications on file     The care is provided during an unprecedented national emergency due to the novel coronavirus, COVID 19.   Little Mahmood MD  Attending Emergency Physician                    Little Mahmood MD  01/05/22 4614

## 2022-01-05 NOTE — ED TRIAGE NOTES
Mode of arrival (squad #, walk in, police, etc) : walk in        Chief complaint(s): ABD pain        Arrival Note (brief scenario, treatment PTA, etc). : Pt states she is having LLQ pain after falling two days PTA. Pt is concerned about her fetus, pt has a confirmed IUP with a DD of 7/31/22        C= \"Have you ever felt that you should Cut down on your drinking? \"  No  A= \"Have people Annoyed you by criticizing your drinking? \"  No  G= \"Have you ever felt bad or Guilty about your drinking? \"  No  E= \"Have you ever had a drink as an Eye-opener first thing in the morning to steady your nerves or to help a hangover? \"  No      Deferred []      Reason for deferring: N/A    *If yes to two or more: probable alcohol abuse. *

## 2022-01-28 ENCOUNTER — HOSPITAL ENCOUNTER (EMERGENCY)
Age: 22
Discharge: HOME | End: 2022-01-28
Attending: EMERGENCY MEDICINE
Payer: MEDICARE

## 2022-01-28 VITALS
BODY MASS INDEX: 23.04 KG/M2 | HEART RATE: 83 BPM | TEMPERATURE: 98.6 F | HEIGHT: 63 IN | WEIGHT: 130 LBS | RESPIRATION RATE: 20 BRPM | OXYGEN SATURATION: 98 % | DIASTOLIC BLOOD PRESSURE: 70 MMHG | SYSTOLIC BLOOD PRESSURE: 119 MMHG

## 2022-01-28 DIAGNOSIS — K04.7 DENTAL ABSCESS: Primary | ICD-10-CM

## 2022-01-28 PROCEDURE — 99285 EMERGENCY DEPT VISIT HI MDM: CPT

## 2022-01-28 PROCEDURE — 6370000000 HC RX 637 (ALT 250 FOR IP): Performed by: NURSE PRACTITIONER

## 2022-01-28 RX ORDER — HYDROCODONE BITARTRATE AND ACETAMINOPHEN 5; 325 MG/1; MG/1
1 TABLET ORAL ONCE
Status: COMPLETED | OUTPATIENT
Start: 2022-01-28 | End: 2022-01-28

## 2022-01-28 RX ORDER — CLINDAMYCIN HYDROCHLORIDE 150 MG/1
300 CAPSULE ORAL ONCE
Status: COMPLETED | OUTPATIENT
Start: 2022-01-28 | End: 2022-01-28

## 2022-01-28 RX ORDER — CLINDAMYCIN HYDROCHLORIDE 300 MG/1
300 CAPSULE ORAL 4 TIMES DAILY
Qty: 40 CAPSULE | Refills: 0 | Status: SHIPPED | OUTPATIENT
Start: 2022-01-28 | End: 2022-02-07

## 2022-01-28 RX ORDER — CLINDAMYCIN HYDROCHLORIDE 300 MG/1
CAPSULE ORAL
COMMUNITY
Start: 2022-01-28 | End: 2022-01-28

## 2022-01-28 RX ORDER — ACETAMINOPHEN AND CODEINE PHOSPHATE 300; 30 MG/1; MG/1
1 TABLET ORAL EVERY 8 HOURS PRN
Qty: 9 TABLET | Refills: 0 | Status: SHIPPED | OUTPATIENT
Start: 2022-01-28 | End: 2022-01-31

## 2022-01-28 RX ADMIN — CLINDAMYCIN HYDROCHLORIDE 300 MG: 150 CAPSULE ORAL at 18:41

## 2022-01-28 RX ADMIN — HYDROCODONE BITARTRATE AND ACETAMINOPHEN 1 TABLET: 5; 325 TABLET ORAL at 18:41

## 2022-01-28 ASSESSMENT — PAIN SCALES - GENERAL
PAINLEVEL_OUTOF10: 9
PAINLEVEL_OUTOF10: 7

## 2022-01-28 ASSESSMENT — ENCOUNTER SYMPTOMS
VOICE CHANGE: 0
FACIAL SWELLING: 1
TROUBLE SWALLOWING: 0

## 2022-01-28 NOTE — ED NOTES
States,\"Can you tell me how long it is going to be before I get some medicine for this? My jaw is really hurting. \"     Kayla Raman RN  01/28/22 1811

## 2022-01-28 NOTE — ED PROVIDER NOTES
[address] [place] [address] ED  EMERGENCY DEPARTMENT ENCOUNTER      Pt Name: Codi Archer  MRN: [id number]  [place] 2000  Date of evaluation: 1/28/2022  Provider: TERESA Chowdhury CNP    CHIEF COMPLAINT       Chief Complaint   Patient presents with    Facial Swelling     right side of jaw x1 week         HISTORY OFPRESENT ILLNESS  (Location/Symptom, Timing/Onset, Context/Setting, Quality, Duration, Modifying Factors, Severity.)   Codi Archer is a 22 y.o. female who presents to the emergency department by private auto for evaluation of right facial swelling that started a week ago. Patient was initially seen at [facility] ER yesterday for same complaint. She had a CT scan of facial bones with contrast which showed no evidence of fracture, left sphenoid sinusitis, and no intraorbital soft tissue swelling. Patient states she has brackets in her jaw when she had a jaw fracture in 2019. Patient states a prescription for clindamycin was sent to her pharmacy for him to the hospital but her pharmacy never received it so she has not taken the antibiotic. The patient had translators a throbbing sensation in her right jaw. No difficulty swallowing. She is afebrile. She also had labs done at the hospital yesterday. WBC was not elevated. Patient also states she is 13 weeks pregnant. No abdominal pain or vaginal bleeding. No nausea or vomiting. Nursing Notes were reviewed. PASTMEDICAL HISTORY     Past Medical History:   Diagnosis Date    ADHD (attention deficit hyperactivity disorder)     Allergic     Seizures ([facility] [address]          SURGICAL HISTORY     History reviewed. No pertinent surgical history.       CURRENT MEDICATIONS     Discharge Medication List as of 1/28/2022  7:06 PM          ALLERGIES     Amoxicillin and Augmentin [amoxicillin-pot clavulanate]    FAMILY HISTORY       Family History   Problem Relation Age of Onset    Stroke Maternal Grandmother     High Blood Pressure Maternal Grandmother  Diabetes Maternal Grandfather           SOCIAL HISTORY       Social History     Socioeconomic History    Marital status: Single     Spouse name: None    Number of children: 1    Years of education: None    Highest education level: None   Occupational History    None   Tobacco Use    Smoking status: Former Smoker    Smokeless tobacco: Never Used    Tobacco comment: off and on   Vaping Use    Vaping Use: Never used   Substance and Sexual Activity    Alcohol use: Not Currently     Comment: Socially    Drug use: Not Currently     Comment: quit 10/28/2019    Sexual activity: Yes     Partners: Male   Other Topics Concern    None   Social History Narrative    None     Social Determinants of Health     Financial Resource Strain:     Difficulty of Paying Living Expenses: Not on file   Food Insecurity:     Worried About Running Out of Food in the Last Year: Not on file    Barbara of Food in the Last Year: Not on file   Transportation Needs:     Lack of Transportation (Medical): Not on file    Lack of Transportation (Non-Medical):  Not on file   Physical Activity:     Days of Exercise per Week: Not on file    Minutes of Exercise per Session: Not on file   Stress:     Feeling of Stress : Not on file   Social Connections:     Frequency of Communication with Friends and Family: Not on file    Frequency of Social Gatherings with Friends and Family: Not on file    Attends Voodoo Services: Not on file    Active Member of [address] [place] [address] or Organizations: Not on file    Attends Club or Organization Meetings: Not on file    Marital Status: Not on file   Intimate Partner Violence:     Fear of Current or Ex-Partner: Not on file    Emotionally Abused: Not on file    Physically Abused: Not on file    Sexually Abused: Not on file   Housing Stability:     Unable to Pay for Housing in the Last Year: Not on file    Number of [place] in the Last Year: Not on file    Unstable Housing in the Last Year: Not on file         REVIEW OF SYSTEMS    (2-9 systems for level 4, 10 or more for level 5)     Review of Systems   Constitutional: Negative for fever. HENT: Positive for dental problem and facial swelling. Negative for trouble swallowing and voice change. All other systems reviewed and are negative. Except as noted above the remainder of the review of systems was reviewed and negative. PHYSICAL EXAM    (up to 7 for level 4, 8 or more for level 5)     ED Triage Vitals [01/28/22 1700]   BP Temp Temp src Pulse Resp SpO2 Height Weight   (!) 120/55 98.8 °F (37.1 °C) -- 85 16 98 % 5' 3\" (1.6 m) 130 lb (59 kg)       Physical Exam  Constitutional:       Appearance: Normal appearance. She is well-developed, well-groomed and normal weight. HENT:      Head: Normocephalic. Right Ear: External ear normal.      Left Ear: External ear normal.      Nose: Nose normal.      Mouth/Throat:      Lips: Pink. Mouth: Mucous membranes are moist.      Dentition: Abnormal dentition. Dental tenderness, gingival swelling and dental caries present. Pharynx: Oropharynx is clear. Comments: Right facial swelling present. There is gingival swelling and dental tenderness to the right lower molars. Metal brackets are visualized. Patient is controlling her secretions. No tongue swelling. Eyes:      Extraocular Movements: Extraocular movements intact. Conjunctiva/sclera: Conjunctivae normal.      Pupils: Pupils are equal, round, and reactive to light. Pulmonary:      Effort: Pulmonary effort is normal. No respiratory distress. Musculoskeletal:         General: Normal range of motion. Cervical back: Full passive range of motion without pain, normal range of motion and neck supple. No edema. Skin:     General: Skin is warm and dry. Capillary Refill: Capillary refill takes less than 2 seconds. Neurological:      Mental Status: She is alert and oriented to person, place, and time.    Psychiatric: Mood and Affect: Mood normal.         Behavior: Behavior normal.         Thought Content: Thought content normal.         Judgment: Judgment normal.           DIAGNOSTIC RESULTS     EKG:All EKG's are interpreted by the Emergency Department Physician who either signs or Co-signs this chart in the absence of a cardiologist.        RADIOLOGY:   Non-plain film images such as CT, Ultrasound and MRI are read by theradiologist. Plain radiographic images are visualized and preliminarily interpreted by the emergency physician with the below findings:    MARLENY - 01/28/2022 12:39 AM EST    HISTORY:  A 58-year-old female with the history of the right-sided facial swelling.  Facial fractures.  Follow-up examination. TECHNIQUE: Multidetector spiral CT scan of the facial bones is performed without intravenous contrast administration. Multiplanar reconstruction images are reformatted. COMPARISON:  No prior relevant studies are available for comparison. FINDINGS: Both orbital and the maxillary walls are intact.  There is no evidence of fracture through the zygomatic arches. There is no evidence of fracture through the nasal bones. The pterygoid plates are intact. There is no evidence of fracture through the mandible.  Both temporomandibular joints are intact. There is partial Viann Marquise indication of the left sphenoidal sinus.  Other paranasal sinuses are clear.  No soft tissue abnormality seen within the orbits. Mastoid air cells are clear. IMPRESSION:  1.  No evidence of fracture or acute bony pathology through the facial bones. 2.  Left sphenoidal sinusitis.  Other paranasal sinuses are clear. 3.  No intraorbital soft tissue abnormality is identified. All CT scans at this facility use dose modulation, iterative reconstruction, and/or weight based dosing when appropriate to reduce radiation dose to as low as reasonably achievable.      Interpretation per the Radiologist below, if available at the time of this note:    No orders to display         EDBEDSIDE ULTRASOUND:   Performed by Graham Calixto - none    LABS:  Labs Reviewed - No data to display    All other labs were within normal range or not returned as of this dictation. EMERGENCY DEPARTMENT COURSE andDIFFERENTIAL DIAGNOSIS/MDM:   Patient evaluated in conjunction with attending physician. Examination shows right facial swelling likely due to dental abscess. Patient is controlling her secretions. No drooling. Reviewed CT facial bones and labs done yesterday at [facility].  Patient states she did not receive her clindamycin prescription that was sent to her pharmacy to the hospital.  Present for pain. She is 12 weeks pregnant. No abdominal pain or vaginal bleeding. Was given clindamycin and Norco in the ED. Prescription written for clindamycin and Tylenol codeine. Discussed risks and benefits of using pain medication with the patient's pregnancy. She is provided with referral for oral maxillofacial follow-up. Strict return precautions provided. Vitals:    Vitals:    01/28/22 1700 01/28/22 1904   BP: (!) 120/55 119/70   Pulse: 85 83   Resp: 16 20   Temp: 98.8 °F (37.1 °C) 98.6 °F (37 °C)   TempSrc:  Oral   SpO2: 98%    Weight: 130 lb (59 kg)    Height: 5' 3\" (1.6 m)          CONSULTS:  None    RES:  Procedures    FINAL IMPRESSION      1.  Dental abscess          DISPOSITION/PLAN   DISPOSITION Decision To Discharge 01/28/2022 07:04:45 PM      PATIENT REFERRED TO:   Lior Mora Shall, [address] [facility] [address] [place] [address] [place]  [phone]    Schedule an appointment as soon as possible for a visit       [facility] ED  [address] [facility]  [phone]    If symptoms worsen      DISCHARGE MEDICATIONS:     Discharge Medication List as of 1/28/2022  7:06 PM      START taking these medications    Details   acetaminophen-codeine (TYLENOL/CODEINE #3) 300-30 MG per tablet Take 1 tablet by mouth every 8 hours as needed for Pain for up to 3 days. Intended supply: 3 days.  Take lowest dose possible to manage pain, Disp-9 tablet, R-0Print           Electronically signed by TERESA Hernandez 1/29/2022 at 10:09 AM           TERESA Hernandez CNP  01/29/22 1011

## 2022-01-29 NOTE — ED PROVIDER NOTES
eMERGENCY dEPARTMENT eNCOUnter   Independent Attestation     Pt Name: Surya Turpin  MRN: [id number]  [place] 2000  Date of evaluation: 1/28/22     Surya Turpin is a 22 y.o. female with CC: Facial Swelling (right side of jaw x1 week)        This visit was performed by both a physician and an APC. I performed all aspects of the MDM as documented. The care is provided during an unprecedented national emergency due to the novel coronavirus, COVID 19.     Keshawn Kapoor MD  Attending Emergency Physician          Philomena Reveles MD  01/28/22 1804

## 2022-01-29 NOTE — ED NOTES
Discharge instructions given with prescriptions and voiced knowledge of self care and follow up. Also provided dental list for additional providers to call for appointment. Out ambulatory with steady gait. Respirations regular and even. No s/s of acute distress noted.       Yonas Ireland RN  01/28/22 9081

## 2022-03-06 ENCOUNTER — HOSPITAL ENCOUNTER (EMERGENCY)
Age: 22
Discharge: HOME | End: 2022-03-06
Attending: EMERGENCY MEDICINE
Payer: MEDICARE

## 2022-03-06 VITALS
RESPIRATION RATE: 16 BRPM | BODY MASS INDEX: 24.27 KG/M2 | SYSTOLIC BLOOD PRESSURE: 123 MMHG | DIASTOLIC BLOOD PRESSURE: 66 MMHG | WEIGHT: 137 LBS | HEIGHT: 63 IN | OXYGEN SATURATION: 97 % | TEMPERATURE: 98.2 F | HEART RATE: 92 BPM

## 2022-03-06 DIAGNOSIS — R10.84 GENERALIZED ABDOMINAL PAIN: Primary | ICD-10-CM

## 2022-03-06 LAB
ABSOLUTE EOS #: 0.15 K/UL (ref 0–0.44)
ABSOLUTE IMMATURE GRANULOCYTE: 0.03 K/UL (ref 0–0.3)
ABSOLUTE LYMPH #: 1.97 K/UL (ref 1.2–5.2)
ABSOLUTE MONO #: 0.46 K/UL (ref 0.1–1.4)
ALBUMIN SERPL-MCNC: 3.2 G/DL (ref 3.5–5.2)
ALBUMIN/GLOBULIN RATIO: 1.1 (ref 1–2.5)
ALP BLD-CCNC: 53 U/L (ref 35–104)
ALT SERPL-CCNC: 7 U/L (ref 5–33)
ANION GAP SERPL CALCULATED.3IONS-SCNC: 9 MMOL/L (ref 9–17)
AST SERPL-CCNC: 11 U/L
BASOPHILS # BLD: 0 % (ref 0–2)
BASOPHILS ABSOLUTE: <0.03 K/UL (ref 0–0.2)
BILIRUB SERPL-MCNC: 0.41 MG/DL (ref 0.3–1.2)
BILIRUBIN URINE: NEGATIVE
BUN BLDV-MCNC: 9 MG/DL (ref 6–20)
CALCIUM SERPL-MCNC: 8.6 MG/DL (ref 8.6–10.4)
CANDIDA SPECIES, DNA PROBE: POSITIVE
CHLORIDE BLD-SCNC: 107 MMOL/L (ref 98–107)
CO2: 20 MMOL/L (ref 20–31)
COLOR: YELLOW
COMMENT UA: NORMAL
CREAT SERPL-MCNC: 0.42 MG/DL (ref 0.5–0.9)
EOSINOPHILS RELATIVE PERCENT: 2 % (ref 1–4)
GARDNERELLA VAGINALIS, DNA PROBE: POSITIVE
GFR AFRICAN AMERICAN: >60 ML/MIN
GFR NON-AFRICAN AMERICAN: >60 ML/MIN
GFR SERPL CREATININE-BSD FRML MDRD: ABNORMAL ML/MIN/{1.73_M2}
GLUCOSE BLD-MCNC: 81 MG/DL (ref 70–99)
GLUCOSE URINE: NEGATIVE
HCT VFR BLD CALC: 25 % (ref 36.3–47.1)
HEMOGLOBIN: 8.4 G/DL (ref 11.9–15.1)
IMMATURE GRANULOCYTES: 0 %
KETONES, URINE: NEGATIVE
LEUKOCYTE ESTERASE, URINE: NEGATIVE
LIPASE: 24 U/L (ref 13–60)
LYMPHOCYTES # BLD: 27 % (ref 25–45)
MCH RBC QN AUTO: 29.9 PG (ref 25.2–33.5)
MCHC RBC AUTO-ENTMCNC: 33.6 G/DL (ref 28.4–34.8)
MCV RBC AUTO: 89 FL (ref 82.6–102.9)
MONOCYTES # BLD: 6 % (ref 2–8)
NITRITE, URINE: NEGATIVE
NRBC AUTOMATED: 0 PER 100 WBC
PDW BLD-RTO: 13.1 % (ref 11.8–14.4)
PH UA: 6.5 (ref 5–8)
PLATELET # BLD: 227 K/UL (ref 138–453)
PMV BLD AUTO: 9.8 FL (ref 8.1–13.5)
POTASSIUM SERPL-SCNC: 4 MMOL/L (ref 3.7–5.3)
PROTEIN UA: NEGATIVE
RBC # BLD: 2.81 M/UL (ref 3.95–5.11)
SEG NEUTROPHILS: 65 % (ref 34–64)
SEGMENTED NEUTROPHILS ABSOLUTE COUNT: 4.69 K/UL (ref 1.8–8)
SODIUM BLD-SCNC: 136 MMOL/L (ref 135–144)
SOURCE: ABNORMAL
SPECIFIC GRAVITY UA: 1.02 (ref 1–1.03)
TOTAL PROTEIN: 6.2 G/DL (ref 6.4–8.3)
TRICHOMONAS VAGINALIS DNA: NEGATIVE
TURBIDITY: CLEAR
URINE HGB: NEGATIVE
UROBILINOGEN, URINE: NORMAL
WBC # BLD: 7.3 K/UL (ref 4.5–13.5)

## 2022-03-06 PROCEDURE — 87591 N.GONORRHOEAE DNA AMP PROB: CPT

## 2022-03-06 PROCEDURE — 87491 CHLMYD TRACH DNA AMP PROBE: CPT

## 2022-03-06 PROCEDURE — 85025 COMPLETE CBC W/AUTO DIFF WBC: CPT

## 2022-03-06 PROCEDURE — 87660 TRICHOMONAS VAGIN DIR PROBE: CPT

## 2022-03-06 PROCEDURE — 83690 ASSAY OF LIPASE: CPT

## 2022-03-06 PROCEDURE — 81003 URINALYSIS AUTO W/O SCOPE: CPT

## 2022-03-06 PROCEDURE — 99285 EMERGENCY DEPT VISIT HI MDM: CPT

## 2022-03-06 PROCEDURE — 87510 GARDNER VAG DNA DIR PROBE: CPT

## 2022-03-06 PROCEDURE — 6370000000 HC RX 637 (ALT 250 FOR IP): Performed by: STUDENT IN AN ORGANIZED HEALTH CARE EDUCATION/TRAINING PROGRAM

## 2022-03-06 PROCEDURE — 80053 COMPREHEN METABOLIC PANEL: CPT

## 2022-03-06 PROCEDURE — 87480 CANDIDA DNA DIR PROBE: CPT

## 2022-03-06 RX ORDER — METRONIDAZOLE 500 MG/1
500 TABLET ORAL 2 TIMES DAILY
Qty: 14 TABLET | Refills: 0 | Status: SHIPPED | OUTPATIENT
Start: 2022-03-06 | End: 2022-03-13

## 2022-03-06 RX ORDER — ACETAMINOPHEN 500 MG
1000 TABLET ORAL ONCE
Status: COMPLETED | OUTPATIENT
Start: 2022-03-06 | End: 2022-03-06

## 2022-03-06 RX ORDER — CLOTRIMAZOLE 1 %
CREAM WITH APPLICATOR VAGINAL
Qty: 1 EACH | Refills: 0 | Status: SHIPPED | OUTPATIENT
Start: 2022-03-06 | End: 2022-03-13

## 2022-03-06 RX ADMIN — ACETAMINOPHEN 1000 MG: 500 TABLET ORAL at 09:54

## 2022-03-06 ASSESSMENT — PAIN SCALES - GENERAL
PAINLEVEL_OUTOF10: 10
PAINLEVEL_OUTOF10: 9

## 2022-03-06 ASSESSMENT — PAIN DESCRIPTION - LOCATION: LOCATION: BACK

## 2022-03-06 ASSESSMENT — PAIN DESCRIPTION - FREQUENCY: FREQUENCY: CONTINUOUS

## 2022-03-06 ASSESSMENT — PAIN DESCRIPTION - ORIENTATION: ORIENTATION: LOWER

## 2022-03-06 ASSESSMENT — PAIN DESCRIPTION - PAIN TYPE: TYPE: ACUTE PAIN

## 2022-03-06 ASSESSMENT — PAIN DESCRIPTION - PROGRESSION: CLINICAL_PROGRESSION: GRADUALLY WORSENING

## 2022-03-06 ASSESSMENT — PAIN DESCRIPTION - ONSET: ONSET: ON-GOING

## 2022-03-06 ASSESSMENT — PAIN - FUNCTIONAL ASSESSMENT: PAIN_FUNCTIONAL_ASSESSMENT: 0-10

## 2022-03-06 NOTE — ED NOTES
Pt. To ER room 10 from triage, ambulatory with steady gait  Pt. Presents with abdominal pain and cramping and low back pain; pt states she is 19 weeks pregnant and has had difficult pregnancies in the past  Pt. Denies vaginal bleeding and abnormal discharge   Pt. Unable to lay still on stretcher, is writhing in pain  Pt.  Vitals stable  Awaiting orders  Will continue to monitor and reassess     Yehuda Lundy RN  03/06/22 1823

## 2022-03-06 NOTE — ED PROVIDER NOTES
[address]  ED  Emergency Department Encounter  EmergencyMedicine Resident     Pt Brijesh Tan  MRN: [id number]  [place] 2000  Date of evaluation: 3/6/22  PCP:  No primary care provider on file. CHIEF COMPLAINT       Chief Complaint   Patient presents with    Abdominal Cramping     19 wks pregnant    Back Pain       HISTORY OF PRESENT ILLNESS  (Location/Symptom, Timing/Onset, Context/Setting, Quality, Duration, Modifying Factors, Severity.)      Angelika Leon is a 22 y.o. female who presents with 19 weeks pregnant, 9/10 suprapubic abdominal pain, fullness in the vaginal area dull ache nonradiating. Patient thinks it is coming in a waxing and waning pattern. Denies gush of fluid, vaginal discharge. Patient had similar pain approximately 2 and half weeks ago, diagnosed with bacterial vaginitis took incomplete dose of metronidazole. She is G4, P3, history of  delivery. Not taking any medications for the pain. Not following up with her OB/GYN. Missed appointment, has appointment scheduled \"soon\" does not know day. Denies chest pain shortness of breath nausea vomiting fever diarrhea    PAST MEDICAL / SURGICAL / SOCIAL / FAMILY HISTORY      has a past medical history of ADHD (attention deficit hyperactivity disorder), Allergic, and Seizures ([facility] [address] has no past surgical history on file.   Denies    Social History     Socioeconomic History    Marital status: Single     Spouse name: Not on file    Number of children: 1    Years of education: Not on file    Highest education level: Not on file   Occupational History    Not on file   Tobacco Use    Smoking status: Former Smoker    Smokeless tobacco: Never Used    Tobacco comment: off and on   Vaping Use    Vaping Use: Never used   Substance and Sexual Activity    Alcohol use: Not Currently     Comment: Socially    Drug use: Not Currently     Comment: quit 10/28/2019    Sexual activity: Yes     Partners: Male Other Topics Concern    Not on file   Social History Narrative    Not on file     Social Determinants of Health     Financial Resource Strain:     Difficulty of Paying Living Expenses: Not on file   Food Insecurity:     Worried About Running Out of Food in the Last Year: Not on file    Barbara of Food in the Last Year: Not on file   Transportation Needs:     Lack of Transportation (Medical): Not on file    Lack of Transportation (Non-Medical): Not on file   Physical Activity:     Days of Exercise per Week: Not on file    Minutes of Exercise per Session: Not on file   Stress:     Feeling of Stress : Not on file   Social Connections:     Frequency of Communication with Friends and Family: Not on file    Frequency of Social Gatherings with Friends and Family: Not on file    Attends Yazdanism Services: Not on file    Active Member of [address] [place] [address] [employer] or Organizations: Not on file    Attends Club or Organization Meetings: Not on file    Marital Status: Not on file   Intimate Partner Violence:     Fear of Current or Ex-Partner: Not on file    Emotionally Abused: Not on file    Physically Abused: Not on file    Sexually Abused: Not on file   Housing Stability:     Unable to Pay for Housing in the Last Year: Not on file    Number of [place] in the Last Year: Not on file    Unstable Housing in the Last Year: Not on file       Family History   Problem Relation Age of Onset    Stroke Maternal Grandmother     High Blood Pressure Maternal Grandmother     Diabetes Maternal Grandfather        Allergies:  Amoxicillin and Augmentin [amoxicillin-pot clavulanate]    Home Medications:  Prior to Admission medications    Medication Sig Start Date End Date Taking? Authorizing Provider   clotrimazole (LOTRIMIN) 1 % vaginal cream Place vaginally 2 times daily.  3/6/22 3/13/22 Yes Vish El MD   metroNIDAZOLE (FLAGYL) 500 MG tablet Take 1 tablet by mouth 2 times daily for 7 days 3/6/22 3/13/22 Yes Vish El MD REVIEW OF SYSTEMS    (2-9 systems for level 4, 10 or more for level 5)      Review of Systems   Constitutional: Negative for fever. HENT: Negative for congestion. Eyes: Negative for photophobia. Respiratory: Negative for shortness of breath. Cardiovascular: Negative for chest pain. Gastrointestinal: Positive for abdominal pain  Endocrine: Negative for polyuria. Genitourinary: Negative for dysuria. Musculoskeletal: Negative for arthralgias. Skin: Negative for color change. Allergic/Immunologic: Negative for immunocompromised state. Neurological: Negative for dizziness. Hematological: Does not bruise/bleed easily. Psychiatric/Behavioral: Negative for agitation. PHYSICAL EXAM   (up to 7 for level 4, 8 or more for level 5)      INITIAL VITALS:   /66   Pulse 92   Temp 98.2 °F (36.8 °C)   Resp 16   Ht 5' 3\" (1.6 m)   Wt 137 lb (62.1 kg)   LMP 11/16/2021   SpO2 97%   BMI 24.27 kg/m²     Physical Exam  Constitutional:       General: Not in acute distress. Appearance: Normal appearance. Normal weight. Not toxic-appearing. HENT:      Head: Normocephalic and atraumatic. Nose: Nose normal.      Mouth/Throat: Mucous membranes are moist.  Uvula midline no oropharyngeal edema. Pharynx: Oropharynx is clear. Eyes:      Extraocular Movements: Extraocular movements intact. Conjunctiva/sclera: Conjunctivae normal.      Pupils: Pupils are equal, round, and reactive to light. Neck:      Musculoskeletal: Normal range of motion and neck supple. No neck rigidity. Cardiovascular:      Rate and Rhythm: Normal rate and regular rhythm. Pulses: Normal pulses. Heart sounds: Normal heart sounds. No murmur. Pulmonary:      Effort: Pulmonary effort is normal.      Breath sounds: Normal breath sounds. No wheezing. Abdominal:      General: Abdomen is flat. Bowel sounds are normal.      Tenderness: There is no abdominal tenderness.      Musculoskeletal: Normal range of motion. General: No swelling or tenderness. No LE edema    Skin:     General: Skin is warm. Capillary Refill: Capillary refill takes less than 2 seconds. Coloration: Skin is not jaundiced. Neurological:      General: No focal deficit present. Mental Status: Alert and oriented to person, place, and time. Mental status is at baseline. Motor: No weakness. DIFFERENTIAL  DIAGNOSIS     PLAN (LABS / IMAGING / EKG):  Orders Placed This Encounter   Procedures    Vaginitis DNA Probe    C.trachomatis N.gonorrhoeae DNA    Comprehensive Metabolic Panel w/ Reflex to MG    Lipase    Urinalysis with Reflex to Culture    PREVIOUS SPECIMEN    CBC with Auto Differential    Inpatient consult to Obstetrics / Gynecology    Insert peripheral IV       MEDICATIONS ORDERED:  Orders Placed This Encounter   Medications    acetaminophen (TYLENOL) tablet 1,000 mg    clotrimazole (LOTRIMIN) 1 % vaginal cream     Sig: Place vaginally 2 times daily. Dispense:  1 each     Refill:  0    metroNIDAZOLE (FLAGYL) 500 MG tablet     Sig: Take 1 tablet by mouth 2 times daily for 7 days     Dispense:  14 tablet     Refill:  0           DIAGNOSTIC RESULTS / EMERGENCY DEPARTMENT COURSE / MDM     LABS:  Results for orders placed or performed during the hospital encounter of 03/06/22   Vaginitis DNA Probe    Specimen: Vaginal   Result Value Ref Range    Source . VAGINAL SWAB     Trichomonas Vaginalis DNA NEGATIVE NEGATIVE    GARDNERELLA VAGINALIS, DNA PROBE POSITIVE (A) NEGATIVE    CANDIDA SPECIES, DNA PROBE POSITIVE (A) NEGATIVE   Comprehensive Metabolic Panel w/ Reflex to MG   Result Value Ref Range    Glucose 81 70 - 99 mg/dL    BUN 9 6 - 20 mg/dL    CREATININE 0.42 (L) 0.50 - 0.90 mg/dL    Calcium 8.6 8.6 - 10.4 mg/dL    Sodium 136 135 - 144 mmol/L    Potassium 4.0 3.7 - 5.3 mmol/L    Chloride 107 98 - 107 mmol/L    CO2 20 20 - 31 mmol/L    Anion Gap 9 9 - 17 mmol/L    Alkaline Phosphatase 53 35 - 104 U/L    ALT 7 5 - 33 U/L    AST 11 <32 U/L    Total Bilirubin 0.41 0.3 - 1.2 mg/dL    Total Protein 6.2 (L) 6.4 - 8.3 g/dL    Albumin 3.2 (L) 3.5 - 5.2 g/dL    Albumin/Globulin Ratio 1.1 1.0 - 2.5    GFR Non-African American >60 >60 mL/min    GFR African American >60 >60 mL/min    GFR Comment         Lipase   Result Value Ref Range    Lipase 24 13 - 60 U/L   Urinalysis with Reflex to Culture    Specimen: Urine   Result Value Ref Range    Color, UA Yellow Yellow    Turbidity UA Clear Clear    Glucose, Ur NEGATIVE NEGATIVE    Bilirubin Urine NEGATIVE NEGATIVE    Ketones, Urine NEGATIVE NEGATIVE    Specific Gravity, UA 1.022 1.005 - 1.030    Urine Hgb NEGATIVE NEGATIVE    pH, UA 6.5 5.0 - 8.0    Protein, UA NEGATIVE NEGATIVE    Urobilinogen, Urine Normal Normal    Nitrite, Urine NEGATIVE NEGATIVE    Leukocyte Esterase, Urine NEGATIVE NEGATIVE    Urinalysis Comments       Microscopic exam not performed based on chemical results unless requested in original order.    CBC with Auto Differential   Result Value Ref Range    WBC 7.3 4.5 - 13.5 k/uL    RBC 2.81 (L) 3.95 - 5.11 m/uL    Hemoglobin 8.4 (L) 11.9 - 15.1 g/dL    Hematocrit 25.0 (L) 36.3 - 47.1 %    MCV 89.0 82.6 - 102.9 fL    MCH 29.9 25.2 - 33.5 pg    MCHC 33.6 28.4 - 34.8 g/dL    RDW 13.1 11.8 - 14.4 %    Platelets 454 099 - 013 k/uL    MPV 9.8 8.1 - 13.5 fL    NRBC Automated 0.0 0.0 per 100 WBC    Seg Neutrophils 65 (H) 34 - 64 %    Lymphocytes 27 25 - 45 %    Monocytes 6 2 - 8 %    Eosinophils % 2 1 - 4 %    Basophils 0 0 - 2 %    Immature Granulocytes 0 0 %    Segs Absolute 4.69 1.80 - 8.00 k/uL    Absolute Lymph # 1.97 1.20 - 5.20 k/uL    Absolute Mono # 0.46 0.10 - 1.40 k/uL    Absolute Eos # 0.15 0.00 - 0.44 k/uL    Basophils Absolute <0.03 0.00 - 0.20 k/uL    Absolute Immature Granulocyte 0.03 0.00 - 0.30 k/uL         RADIOLOGY:  None    EKG  None    All EKG's are interpreted by the Emergency Department Physician who either signs or Co-signs this chart in the absence of a cardiologist.    EMERGENCY DEPARTMENT COURSE:  Patient breathing quietly and unlabored on room air. Speech is normal and speaking in full sentences without requiring to pause to take a breath. Clinically patient appears well vital signs stable afebrile point-of-care abdominal ultrasound shows good fetal movement fetal heart rate 135. Speculum exam deserved for OB/GYN consult. Will get abdominal labs, Tylenol. OB/GYN feels comfortable with outpatient follow-up and reports cervix thick and closed no bleeding, blood work unremarkable, vaginal swabs for bacterial vaginitis and yeast infection. Will prescribe appropriate medications, did patient teaching. .  Patient doing much better, requesting discharge. Patient to follow-up. Given return precautions. Patient understands and agrees. PROCEDURES:  None    CONSULTS:  IP CONSULT TO OB GYN    CRITICAL CARE:  None    FINAL IMPRESSION      1. Generalized abdominal pain          DISPOSITION / PLAN     DISPOSITION Decision To Discharge 03/06/2022 11:26:24 AM      PATIENT REFERRED TO:  No follow-up provider specified. DISCHARGE MEDICATIONS:  Discharge Medication List as of 3/6/2022 11:30 AM      START taking these medications    Details   clotrimazole (LOTRIMIN) 1 % vaginal cream Place vaginally 2 times daily. , Disp-1 each, R-0Print      metroNIDAZOLE (FLAGYL) 500 MG tablet Take 1 tablet by mouth 2 times daily for 7 days, Disp-14 tablet, R-0Print             Blas Valentine MD  Emergency Medicine Resident    (Please note that portions of thisnote were completed with a voice recognition program.  Efforts were made to edit the dictations but occasionally words are mis-transcribed.)       Blas Valentine MD  Resident  03/06/22 2894

## 2022-03-06 NOTE — ED PROVIDER NOTES
[facility] ED     Emergency Department     Faculty Attestation    I performed a history and physical examination of the patient and discussed management with the resident. I reviewed the residents note and agree with the documented findings and plan of care. Any areas of disagreement are noted on the chart. I was personally present for the key portions of any procedures. I have documented in the chart those procedures where I was not present during the key portions. I have reviewed the emergency nurses triage note. I agree with the chief complaint, past medical history, past surgical history, allergies, medications, social and family history as documented unless otherwise noted below. For Physician Assistant/ Nurse Practitioner cases/documentation I have personally evaluated this patient and have completed at least one if not all key elements of the E/M (history, physical exam, and MDM). Additional findings are as noted. This patient was evaluated in the Emergency Department for symptoms described in the history of present illness. He/she was evaluated in the context of the global COVID-19 pandemic, which necessitated consideration that the patient might be at risk for infection with the SARS-CoV-2 virus that causes COVID-19. Institutional protocols and algorithms that pertain to the evaluation of patients at risk for COVID-19 are in a state of rapid change based on information released by regulatory bodies including the CDC and federal and state organizations. These policies and algorithms were followed during the patient's care in the ED. Patient here with abdominal pain cramping concern for contractions. Patient is G3, P4, 19 weeks 0 days, EDC of 7/31/2022. Follows at [facility] clinic. No vaginal bleeding no spotting no gush of fluid. Pain began last night. Pain is intermittent more than 5 minutes apart. Nausea but no vomiting. Urinary complaints as well.   On exam patient is very uncomfortable actually on the bed on her hands and knees in a position of comfort rolled to her back abdomen is soft no palpable contractions uterine fundus is just below the umbilicus. Bedside ultrasound shows a active fetal movements appropriate amniotic fluid and fetal heart rate of 132.   Will check urine, call OB      Critical Care     none    Matteo De Jesus MD, Cecille Copeland  Attending Emergency  Physician             Matteo De Jesus MD  03/06/22 [id number]

## 2022-03-06 NOTE — CONSULTS
[address] [facility]    Patient Name: Paras Ramos     Patient : 2000  Room/Bed: 10/10  Admission Date/Time: 3/6/2022  9:15 AM  Primary Care Physician: No primary care provider on file. Consulting Provider: Dr. Nirali John  Reason for Consult: Pelvic pressure, abdominal pain    CC:   Chief Complaint   Patient presents with    Abdominal Cramping     19 wks pregnant    Back Pain                HPI: Paras Ramos is a 22 y.o. female [id number] at 19w0d who presents to the ER, c/o abdominal pain and vaginal pressure. The patient was seen at [facility] for similar symptoms and was found to have BV at that time. She was discharged home on Metrogel but did not complete this. She reports the pain has been on and off since yesterday. It is a sharp pain in her bilateral lower abdomen/pelvis that radiates to her vagina. Patient has tried Tylenol for the pain without relief. She denies abdominal cramping, leakage of fluid, vaginal bleeding, dysuria. She reports she is feeling some fetal movement.        REVIEW OF SYSTEMS:  Constitutional: negative fever, chills  HEENT: negative headaches, visual disturbances  Respiratory: negative dyspnea, cough, wheezing  Cardiovascular: negative chest pain, palpitations  Gastrointestinal: negative abdominal pain, RUQ pain, N/V, diarrhea, constipation  Genitourinary: negative dysuria, frequency, hesitancy, hematuria, changes in vaginal discharge  Dermatological: negative rash  Hematologic: negative bruising  Immunologic/Lymphatic: negative recent illness, recent sick contact, LE swelling   Musculoskeletal: negative back pain, myalgias, arthralgias  Neurological: negative dizziness, weakness, loss of sensation, tingling  Behavior/Psych: negative depression, anxiety  Obstetrics: positive fetal movement, negative LOF, negative vaginal bleeding, negative contractions, positive pelvic pain consistent with round ligament pain  _______________________________________________________________________      OBSTETRICAL HISTORY:   OB History    Para Term  AB Living   4 3 3 0 0 3   SAB IAB Ectopic Molar Multiple Live Births   0 0 0 0 0 3      # Outcome Date GA Lbr Vito/2nd Weight Sex Delivery Anes PTL Lv   4 Current            3 Term 22 37w2d   F Vag-Spont   SADAF   2 Term 21 39w0d  7 lb 8 oz (3.402 kg) F Vag-Spont   SADAF   1 Term 17 40w1d   M Vag-Spont   SADAF       PAST MEDICAL HISTORY:   has a past medical history of ADHD (attention deficit hyperactivity disorder), Allergic, and Seizures ([address] PAST SURGICAL HISTORY:   has no past surgical history on file. ALLERGIES:  Allergies as of 2022 - Fully Reviewed 2022   Allergen Reaction Noted    Amoxicillin  2021    Augmentin [amoxicillin-pot clavulanate] Rash 2015       MEDICATIONS:  No current facility-administered medications for this encounter. No current outpatient medications on file. FAMILY HISTORY:  family history includes Diabetes in her maternal grandfather; High Blood Pressure in her maternal grandmother; Stroke in her maternal grandmother. SOCIAL HISTORY:   reports that she has quit smoking. She has never used smokeless tobacco. She reports previous alcohol use. She reports previous drug use.  ________________________________________________________________________                                    Duong Denney:  Vitals:    22   BP:  123/66   Pulse: 92    Resp: 16    Temp: 98.2 °F (36.8 °C)    SpO2: 97%    Weight: 137 lb (62.1 kg)    Height: 5' 3\" (1.6 m)                                                     INPUT/OUTPUT:  No intake/output data recorded. No intake/output data recorded. PHYSICAL EXAM:     General Appearance: Appears healthy.   Alert; in no mild distress. Pleasant. Skin: Skin color, texture, turgor normal. No rashes or lesions. Lymphatic: No abnormally enlarged lymph nodes. Neck and EENT: normal atraumatic, no neck masses, normal thyroid, no jvd  Respiratory: Normal expansion. Clear to auscultation. No rales, rhonchi, or wheezing.   Cardiovascular: regular rate and rhythm, no murmurs rubs or gallops  Abdomen: soft, non-tender, non-distended, no right upper quadrant tenderness and no CVA tenderness, no rebound, guarding, or rigidity, no abdominal scars, fundus palpated just below the umbilicus, no palpable uterine contractions   Pelvic Exam:   Chaperone for Intimate Exam: Chaperone was present for entire exam  SSE: External genitalia: General appearance; normal, Hair distribution; normal, Lesions absent, urethral meatus normal, normal vaginal mucosa, thin grey discharge, normal appearing multiparous cervix without discharge or lesions, closed and thick  SVE: closed cervix, thick  Musculoskeletal: no gross abnormalities  Extremities: non-tender BLE and non-edematous  Psych:  oriented to time, place and person, mood and affect are within normal limits   BSUS:  BPM, active fetal movement         LAB RESULTS:  Results for orders placed or performed during the hospital encounter of 03/06/22   Comprehensive Metabolic Panel w/ Reflex to MG   Result Value Ref Range    Glucose 81 70 - 99 mg/dL    BUN 9 6 - 20 mg/dL    CREATININE 0.42 (L) 0.50 - 0.90 mg/dL    Calcium 8.6 8.6 - 10.4 mg/dL    Sodium 136 135 - 144 mmol/L    Potassium 4.0 3.7 - 5.3 mmol/L    Chloride 107 98 - 107 mmol/L    CO2 20 20 - 31 mmol/L    Anion Gap 9 9 - 17 mmol/L    Alkaline Phosphatase 53 35 - 104 U/L    ALT 7 5 - 33 U/L    AST 11 <32 U/L    Total Bilirubin 0.41 0.3 - 1.2 mg/dL    Total Protein 6.2 (L) 6.4 - 8.3 g/dL    Albumin 3.2 (L) 3.5 - 5.2 g/dL    Albumin/Globulin Ratio 1.1 1.0 - 2.5    GFR Non-African American >60 >60 mL/min    GFR African American >60 >60 mL/min    GFR Comment         Lipase   Result Value Ref Range    Lipase 24 13 - 60 U/L   Urinalysis with Reflex to Culture    Specimen: Urine   Result Value Ref Range    Color, UA Yellow Yellow    Turbidity UA Clear Clear    Glucose, Ur NEGATIVE NEGATIVE    Bilirubin Urine NEGATIVE NEGATIVE    Ketones, Urine NEGATIVE NEGATIVE    Specific Gravity, UA 1.022 1.005 - 1.030    Urine Hgb NEGATIVE NEGATIVE    pH, UA 6.5 5.0 - 8.0    Protein, UA NEGATIVE NEGATIVE    Urobilinogen, Urine Normal Normal    Nitrite, Urine NEGATIVE NEGATIVE    Leukocyte Esterase, Urine NEGATIVE NEGATIVE    Urinalysis Comments       Microscopic exam not performed based on chemical results unless requested in original order. CBC with Auto Differential   Result Value Ref Range    WBC 7.3 4.5 - 13.5 k/uL    RBC 2.81 (L) 3.95 - 5.11 m/uL    Hemoglobin 8.4 (L) 11.9 - 15.1 g/dL    Hematocrit 25.0 (L) 36.3 - 47.1 %    MCV 89.0 82.6 - 102.9 fL    MCH 29.9 25.2 - 33.5 pg    MCHC 33.6 28.4 - 34.8 g/dL    RDW 13.1 11.8 - 14.4 %    Platelets 038 729 - 665 k/uL    MPV 9.8 8.1 - 13.5 fL    NRBC Automated 0.0 0.0 per 100 WBC    Seg Neutrophils 65 (H) 34 - 64 %    Lymphocytes 27 25 - 45 %    Monocytes 6 2 - 8 %    Eosinophils % 2 1 - 4 %    Basophils 0 0 - 2 %    Immature Granulocytes 0 0 %    Segs Absolute 4.69 1.80 - 8.00 k/uL    Absolute Lymph # 1.97 1.20 - 5.20 k/uL    Absolute Mono # 0.46 0.10 - 1.40 k/uL    Absolute Eos # 0.15 0.00 - 0.44 k/uL    Basophils Absolute <0.03 0.00 - 0.20 k/uL    Absolute Immature Granulocyte 0.03 0.00 - 0.30 k/uL         DIAGNOSTICS:  No results found.         ASSESSMENT & PLAN:  Carlos Alberto Martinez is a 22 y.o. female [id number] at 19w0d with abdominal pain consistent with Round Ligament Pain   - Patient presents with lower abdominal, pelvic pain that is sharp and radiates to the vagina   - Denies contractions or LOF    - Previously positive BV on vaginitis 2/24/22 at [facility], patient did not take entire course of antibiotic   - Abdominal exam showed uterine fundus at the umbilicus with no uterine contractions palpated, tenderness in the bilateral pelvis, positive pain reproduced with lateral translation of the uterus   -  BPM, active fetal movement on US    - SSE: thick, closed cervix, minimal thin white discarge, no lesions    - SVE: closed and thick cervix   - CBC significant for Hgb 8.4, otherwise CBC, CMP wnl   - UA negative for infection   - GC/CT and vaginitis collected   - Most likely round ligament pain with persistent bacterial vaginosis    - Discussed physiology of pregnancy and etiology of round ligament pain. Patient reassured that although painful, is not a sign of  labor and is not an obstetric emergency. Discussed management of symptoms with Tylenol, warm epsom salt baths, pregnancy support bands, and possible physical therapy. - Discussed possibility of persistent BV due to inadequate treatment, patient encouraged to take full course of antibiotics if positive. - Patient reports she does have follow up with her OB provider at [address] but cannot remember the date. Encouraged close follow up   - Patient is stable for discharge home at this time from an OB standpoint        Patient Active Problem List    Diagnosis Date Noted    HRP (high risk pregnancy), third trimester 2021    28 weeks gestation of pregnancy 2020    Anemia 2020    Abdominal pain affecting pregnancy     Chlamydia 2020     Positive on 10/18/19, 19, 20. Patient treated during admission on 20. Patient instructed to have partner treated and remain abstinent for 7 days following treatment. She will need ARTURO in 3-6 weeks.        Marijuana use 2020    Former smoker 2020    HRP (high risk pregnancy) 2020    [address] [address]     ADHD (attention deficit hyperactivity disorder)     Abdominal pain during pregnancy in second trimester     Pelvic pain affecting pregnancy in second trimester, antepartum  Young multigravida in second trimester     BV  01/25/2016     Rx flagyl 500mg po BID given 1/25/16      GERD  01/25/2016     Rx pepcid given 1/25/16      High risk teen pregnancy 11/12/2015     Rh+/RI/GBSunknown         Plan discussed with Dr. Jeanie Kam, who is agreeable.      Attending's Name: Dr. Addis Lyon DO  Ob/Gyn Resident  Pager: [phone]  [facility]  3/6/2022, 10:47 AM

## 2022-03-08 ENCOUNTER — TELEPHONE (OUTPATIENT)
Dept: PHARMACY | Age: 22
End: 2022-03-08

## 2022-03-21 ENCOUNTER — APPOINTMENT (OUTPATIENT)
Dept: CT IMAGING | Age: 22
End: 2022-03-21
Payer: MEDICARE

## 2022-03-21 ENCOUNTER — HOSPITAL ENCOUNTER (EMERGENCY)
Age: 22
Discharge: ANOTHER HOSPITAL | End: 2022-03-21
Attending: EMERGENCY MEDICINE
Payer: MEDICARE

## 2022-03-21 VITALS
TEMPERATURE: 98.4 F | OXYGEN SATURATION: 98 % | RESPIRATION RATE: 14 BRPM | WEIGHT: 140 LBS | HEART RATE: 94 BPM | DIASTOLIC BLOOD PRESSURE: 59 MMHG | HEIGHT: 63 IN | BODY MASS INDEX: 24.8 KG/M2 | SYSTOLIC BLOOD PRESSURE: 110 MMHG

## 2022-03-21 DIAGNOSIS — L02.91 ABSCESS: Primary | ICD-10-CM

## 2022-03-21 LAB
ANION GAP SERPL CALCULATED.3IONS-SCNC: 10 MMOL/L (ref 9–17)
BUN BLDV-MCNC: 11 MG/DL (ref 6–20)
CALCIUM SERPL-MCNC: 8.5 MG/DL (ref 8.6–10.4)
CHLORIDE BLD-SCNC: 103 MMOL/L (ref 98–107)
CO2: 20 MMOL/L (ref 20–31)
CREAT SERPL-MCNC: 0.55 MG/DL (ref 0.5–0.9)
GFR AFRICAN AMERICAN: >60 ML/MIN
GFR NON-AFRICAN AMERICAN: >60 ML/MIN
GFR SERPL CREATININE-BSD FRML MDRD: ABNORMAL ML/MIN/{1.73_M2}
GLUCOSE BLD-MCNC: 85 MG/DL (ref 70–99)
HCT VFR BLD CALC: 23.8 % (ref 36.3–47.1)
HEMOGLOBIN: 8 G/DL (ref 11.9–15.1)
MCH RBC QN AUTO: 30.5 PG (ref 25.2–33.5)
MCHC RBC AUTO-ENTMCNC: 33.6 G/DL (ref 28.4–34.8)
MCV RBC AUTO: 90.8 FL (ref 82.6–102.9)
NRBC AUTOMATED: 0 PER 100 WBC
PDW BLD-RTO: 13.2 % (ref 11.8–14.4)
PLATELET # BLD: 220 K/UL (ref 138–453)
PMV BLD AUTO: 10.4 FL (ref 8.1–13.5)
POTASSIUM SERPL-SCNC: 3.5 MMOL/L (ref 3.7–5.3)
RBC # BLD: 2.62 M/UL (ref 3.95–5.11)
SODIUM BLD-SCNC: 133 MMOL/L (ref 135–144)
WBC # BLD: 8.3 K/UL (ref 4.5–13.5)

## 2022-03-21 PROCEDURE — 96365 THER/PROPH/DIAG IV INF INIT: CPT

## 2022-03-21 PROCEDURE — 70491 CT SOFT TISSUE NECK W/DYE: CPT

## 2022-03-21 PROCEDURE — 6360000004 HC RX CONTRAST MEDICATION: Performed by: GENERAL PRACTICE

## 2022-03-21 PROCEDURE — 99285 EMERGENCY DEPT VISIT HI MDM: CPT

## 2022-03-21 PROCEDURE — 85027 COMPLETE CBC AUTOMATED: CPT

## 2022-03-21 PROCEDURE — 6370000000 HC RX 637 (ALT 250 FOR IP): Performed by: EMERGENCY MEDICINE

## 2022-03-21 PROCEDURE — 6370000000 HC RX 637 (ALT 250 FOR IP): Performed by: STUDENT IN AN ORGANIZED HEALTH CARE EDUCATION/TRAINING PROGRAM

## 2022-03-21 PROCEDURE — 80048 BASIC METABOLIC PNL TOTAL CA: CPT

## 2022-03-21 PROCEDURE — 2500000003 HC RX 250 WO HCPCS: Performed by: GENERAL PRACTICE

## 2022-03-21 RX ORDER — ACETAMINOPHEN 500 MG
1000 TABLET ORAL ONCE
Status: COMPLETED | OUTPATIENT
Start: 2022-03-21 | End: 2022-03-21

## 2022-03-21 RX ORDER — CLINDAMYCIN HYDROCHLORIDE 300 MG/1
300 CAPSULE ORAL 4 TIMES DAILY
Qty: 40 CAPSULE | Refills: 0 | Status: SHIPPED | OUTPATIENT
Start: 2022-03-21 | End: 2022-03-31

## 2022-03-21 RX ORDER — CLINDAMYCIN PHOSPHATE 300 MG/50ML
300 INJECTION INTRAVENOUS ONCE
Status: COMPLETED | OUTPATIENT
Start: 2022-03-21 | End: 2022-03-21

## 2022-03-21 RX ORDER — FAMOTIDINE 20 MG/1
TABLET, FILM COATED ORAL
COMMUNITY
Start: 2022-03-07

## 2022-03-21 RX ORDER — ONDANSETRON 4 MG/1
4 TABLET, ORALLY DISINTEGRATING ORAL EVERY 8 HOURS PRN
COMMUNITY
Start: 2022-02-08

## 2022-03-21 RX ORDER — ONDANSETRON 4 MG/1
4 TABLET, ORALLY DISINTEGRATING ORAL ONCE
Status: COMPLETED | OUTPATIENT
Start: 2022-03-21 | End: 2022-03-21

## 2022-03-21 RX ADMIN — IOPAMIDOL 75 ML: 755 INJECTION, SOLUTION INTRAVENOUS at 12:35

## 2022-03-21 RX ADMIN — ONDANSETRON 4 MG: 4 TABLET, ORALLY DISINTEGRATING ORAL at 20:07

## 2022-03-21 RX ADMIN — ACETAMINOPHEN 1000 MG: 500 TABLET ORAL at 20:07

## 2022-03-21 RX ADMIN — CLINDAMYCIN PHOSPHATE 300 MG: 300 INJECTION, SOLUTION INTRAVENOUS at 15:22

## 2022-03-21 RX ADMIN — ACETAMINOPHEN 1000 MG: 500 TABLET ORAL at 12:11

## 2022-03-21 ASSESSMENT — ENCOUNTER SYMPTOMS
FACIAL SWELLING: 1
VOICE CHANGE: 0
BACK PAIN: 0
COUGH: 0
TROUBLE SWALLOWING: 0
SHORTNESS OF BREATH: 0

## 2022-03-21 ASSESSMENT — PAIN DESCRIPTION - LOCATION: LOCATION: FACE

## 2022-03-21 ASSESSMENT — PAIN SCALES - GENERAL
PAINLEVEL_OUTOF10: 2
PAINLEVEL_OUTOF10: 10
PAINLEVEL_OUTOF10: 5

## 2022-03-21 ASSESSMENT — PAIN DESCRIPTION - FREQUENCY: FREQUENCY: CONTINUOUS

## 2022-03-21 NOTE — ED NOTES
Pt resting on stretcher. Denies any needs at this time. Call light within reach. Family at bedside.      Diandra Blanco RN  03/21/22 [id number]

## 2022-03-21 NOTE — Clinical Note
The patient has decided to leave against medical advice, because of family issues. She has normal mental status and adequate capacity to make medical decisions. The patient refuses hospital admission and wants to be discharged. The risks hav e been explained to the patient, including worsening illness, chronic pain, permanent disability, and death. The benefits of admission have also been explained, including the availability and proximity of nurses, physicians, monitoring, diagnostic  testing, and treatment. The patient was able to understand and state the risks and benefits of hospital admission. This was witnessed by nurse and me. She had the opportunity to ask questions about her medical condition. The patient was tr eated to the extent that she would allow, and knows that she may return for care at any time.     Follow up has been discussed

## 2022-03-21 NOTE — ED PROVIDER NOTES
FACULTY SIGN-OUT  ADDENDUM       Patient: Clyde Anaya   MRN: [id number]  PCP:  TERESA Urbina CNM  Attestation  I was available and discussed any additional care issues that arose and coordinated the management plans with the resident(s) caring for the patient during my duty period. Any areas of disagreement with resident's documentation of care or procedures are noted on the chart. I was personally present for the key portions of any/all procedures during my duty period. I have documented in the chart those procedures where I was not present during the key portions. The patient's initial evaluation and plan have been discussed with the prior provider who initially evaluated the patient. Pertinent Comments: The patient is a 22 y.o. 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     We are awaiting transfer to [facility]    ED COURSE      The patient was given the following medications:  Orders Placed This Encounter   Medications    acetaminophen (TYLENOL) tablet 1,000 mg    iopamidol (ISOVUE-370) 76 % injection 75 mL    clindamycin (CLEOCIN) 300 MG capsule     Sig: Take 1 capsule by mouth 4 times daily for 10 days     Dispense:  40 capsule     Refill:  0    clindamycin (CLEOCIN) IVPB 300 mg     Order Specific Question:   Antimicrobial Indications     Answer:   Skin and Soft Tissue Infection       RECENT VITALS:   BP: 126/72  Pulse: 92  Resp: 18  Temp: 92 °F (33.3 °C) SpO2: 98 %    (Please note that portions of this note were completed with a voice recognition program.  Efforts were made to edit the dictations but occasionally words are mis-transcribed.)    MD Micaela ePraza  Attending Emergency Medicine Physician       Denton Kapoor MD  03/21/22 [id number]

## 2022-03-21 NOTE — ED TRIAGE NOTES
Pt presents to ED with Abcess to right jaw line that patients states has been there for weeks.  Pt states she has been taking tylenol to help with the pain

## 2022-03-21 NOTE — ED PROVIDER NOTES
[address] [facility]  ED  Emergency Department Encounter  EmergencyMedicine Resident     Pt Name:Eda Blackwood  MRN: [id number]  [place] 2000  Date of evaluation: 3/21/22  PCP:  Saurav Stinson       Chief Complaint   Patient presents with    Abscess     right lower cheek       HISTORY OF PRESENT ILLNESS  (Location/Symptom, Timing/Onset, Context/Setting, Quality, Duration, Modifying Factors, Severity.)      Justa Colin is a 22 y.o. female who presents with right-sided facial swelling and drainage. Patient has been dealing with this since January. Patient states that she had a fracture of her jaw and she has brackets in her jaw from a fracture 2019 patient has been on clindamycin, but did not take the antibiotic. Patient was also seen for this several weeks ago was given antibiotics but states it was too strong and she stopped taking antibiotic. Patient shortness and drainage again today. Patient states that she was supposed to have the hardware removed but got an argument with her oral surgeon and has not been back. Per chart review patient did have a CT face as well as labs in January which was unremarkable. Patient is approximately 21 to 23 weeks pregnant, has no abdominal pain no vaginal discharge or vaginal bleeding or any other complaints associate with her pregnancy. PAST MEDICAL / SURGICAL / SOCIAL / FAMILY HISTORY      has a past medical history of ADHD (attention deficit hyperactivity disorder), Allergic, and Seizures ([facility] [address] has no past surgical history on file.     Social History     Socioeconomic History    Marital status: Single     Spouse name: Not on file    Number of children: 1    Years of education: Not on file    Highest education level: Not on file   Occupational History    Not on file   Tobacco Use    Smoking status: Former Smoker    Smokeless tobacco: Never Used    Tobacco comment: off and on   Vaping Use    Vaping Use: Never used   Substance and Sexual Activity    Alcohol use: Not Currently     Comment: Socially    Drug use: Not Currently     Comment: quit 10/28/2019    Sexual activity: Yes     Partners: Male   Other Topics Concern    Not on file   Social History Narrative    Not on file     Social Determinants of Health     Financial Resource Strain:     Difficulty of Paying Living Expenses: Not on file   Food Insecurity:     Worried About Running Out of Food in the Last Year: Not on file    Barbara of Food in the Last Year: Not on file   Transportation Needs:     Lack of Transportation (Medical): Not on file    Lack of Transportation (Non-Medical): Not on file   Physical Activity:     Days of Exercise per Week: Not on file    Minutes of Exercise per Session: Not on file   Stress:     Feeling of Stress : Not on file   Social Connections:     Frequency of Communication with Friends and Family: Not on file    Frequency of Social Gatherings with Friends and Family: Not on file    Attends Rastafarian Services: Not on file    Active Member of [address] [place] [address] [employer] or Organizations: Not on file    Attends Club or Organization Meetings: Not on file    Marital Status: Not on file   Intimate Partner Violence:     Fear of Current or Ex-Partner: Not on file    Emotionally Abused: Not on file    Physically Abused: Not on file    Sexually Abused: Not on file   Housing Stability:     Unable to Pay for Housing in the Last Year: Not on file    Number of [place] in the Last Year: Not on file    Unstable Housing in the Last Year: Not on file       Family History   Problem Relation Age of Onset    Stroke Maternal Grandmother     High Blood Pressure Maternal Grandmother     Diabetes Maternal Grandfather        Allergies:  Amoxicillin and Augmentin [amoxicillin-pot clavulanate]    Home Medications:  Prior to Admission medications    Medication Sig Start Date End Date Taking?  Authorizing Provider   ondansetron (ZOFRAN-ODT) 4 MG disintegrating tablet Take 4 mg by mouth every 8 hours as needed 2/8/22  Yes Historical Provider, MD   clindamycin (CLEOCIN) 300 MG capsule Take 1 capsule by mouth 4 times daily for 10 days 3/21/22 3/31/22 Yes David [place], DO   famotidine (PEPCID) 20 MG tablet  3/7/22   Historical Provider, MD       REVIEW OF SYSTEMS    (2-9 systems for level 4, 10 or more for level 5)      Review of Systems   Constitutional: Negative for activity change, appetite change, chills and fever. HENT: Positive for dental problem and facial swelling. Negative for trouble swallowing and voice change. Respiratory: Negative for cough and shortness of breath. Genitourinary: Negative for vaginal bleeding and vaginal discharge. Pregnant   Musculoskeletal: Negative for back pain. Skin: Negative for rash and wound. Neurological: Negative for dizziness and headaches. Psychiatric/Behavioral: Negative for agitation and confusion. The patient is not nervous/anxious. PHYSICAL EXAM   (up to 7 for level 4, 8 or more for level 5)      INITIAL VITALS:   /72   Pulse 92   Temp 92 °F (33.3 °C)   Resp 18   Ht 5' 3\" (1.6 m)   Wt 140 lb (63.5 kg)   LMP 11/16/2021   SpO2 98%   BMI 24.80 kg/m²     Physical Exam  Vitals reviewed. Constitutional:       Comments: Patient is alert sitting comfortably in stretcher, has obvious facial swelling to right jaw. Not toxic appearing is nonacute distress   HENT:      Head: Normocephalic and atraumatic. Mouth/Throat:      Mouth: Mucous membranes are moist.      Comments: No trismus however patient does have a decreased range of motion open her mouth secondary to pain, large very tender firm round mass on right-sided lower jaw, tongue elevation submental area is soft  Eyes:      Extraocular Movements: Extraocular movements intact. Pupils: Pupils are equal, round, and reactive to light. Cardiovascular:      Rate and Rhythm: Normal rate.    Pulmonary:      Effort: Pulmonary effort is normal.      Breath sounds: Normal breath sounds.    Abdominal:      Comments: Gravid uterus, no tenderness palpation         DIFFERENTIAL  DIAGNOSIS     PLAN (LABS / IMAGING / EKG):  Orders Placed This Encounter   Procedures    CT SOFT TISSUE NECK W CONTRAST    CBC    Basic Metabolic Panel       MEDICATIONS ORDERED:  Orders Placed This Encounter   Medications    acetaminophen (TYLENOL) tablet 1,000 mg    iopamidol (ISOVUE-370) 76 % injection 75 mL    clindamycin (CLEOCIN) 300 MG capsule     Sig: Take 1 capsule by mouth 4 times daily for 10 days     Dispense:  40 capsule     Refill:  0    clindamycin (CLEOCIN) IVPB 300 mg     Order Specific Question:   Antimicrobial Indications     Answer:   Skin and Soft Tissue Infection       DDX: Abscess, Ludwigs's angina, dental abscess, fracture, low suspicion for fracture or cancer    DIAGNOSTIC RESULTS / EMERGENCY DEPARTMENT COURSE / MDM   :  Results for orders placed or performed during the hospital encounter of 03/21/22   CBC   Result Value Ref Range    WBC 8.3 4.5 - 13.5 k/uL    RBC 2.62 (L) 3.95 - 5.11 m/uL    Hemoglobin 8.0 (L) 11.9 - 15.1 g/dL    Hematocrit 23.8 (L) 36.3 - 47.1 %    MCV 90.8 82.6 - 102.9 fL    MCH 30.5 25.2 - 33.5 pg    MCHC 33.6 28.4 - 34.8 g/dL    RDW 13.2 11.8 - 14.4 %    Platelets 994 099 - 945 k/uL    MPV 10.4 8.1 - 13.5 fL    NRBC Automated 0.0 0.0 per 100 WBC   Basic Metabolic Panel   Result Value Ref Range    Glucose 85 70 - 99 mg/dL    BUN 11 6 - 20 mg/dL    CREATININE 0.55 0.50 - 0.90 mg/dL    Calcium 8.5 (L) 8.6 - 10.4 mg/dL    Sodium 133 (L) 135 - 144 mmol/L    Potassium 3.5 (L) 3.7 - 5.3 mmol/L    Chloride 103 98 - 107 mmol/L    CO2 20 20 - 31 mmol/L    Anion Gap 10 9 - 17 mmol/L    GFR Non-African American >60 >60 mL/min    GFR African American >60 >60 mL/min    GFR Comment                 RADIOLOGY:  HISTORY:  ORDERING SYSTEM PROVIDED HISTORY: right sided facial swelling  TECHNOLOGIST PROVIDED HISTORY:  right sided facial swelling  Decision Support Exception - unselect if not a suspected or confirmed  emergency medical condition->Emergency Medical Condition (MA)  Reason for Exam: right sided facial swelling     FINDINGS:  PHARYNX/LARYNX:  The palatine tonsils are normal in appearance. The tongue  is normal in appearance. The valleculae, epiglottis, aryepiglottic folds and  pyriform sinuses appear unremarkable. The true and false vocal cords are  normal in appearance. No mass or abscess is seen.     SALIVARY GLANDS/THYROID:  The parotid and submandibular glands appear  unremarkable. The thyroid gland appears unremarkable.     LYMPH NODES:  No cervical or supraclavicular lymphadenopathy is seen.     SOFT TISSUES:  There is subcutaneous soft tissue swelling of the right  premandibular soft tissue, likely related to cellulitis, with a 2.4 cm  abscess in the subcutaneous soft tissue. There is scattered dental disease. There is abscess in the right mandible, at the root of right mandibular 1st  molar tooth, with cortical defect at the buccal surface, adjacent to the soft  tissue abscess.     BRAIN/ORBITS/SINUSES:  The visualized portion of the intracranial contents  appear unremarkable. The visualized portion of the orbits, paranasal sinuses  and mastoid air cells demonstrate no acute abnormality.     LUNG APICES/SUPERIOR MEDIASTINUM:  No focal consolidation is seen within the  visualized lung apices. No superior mediastinal lymphadenopathy or mass.   The visualized portion of the trachea appears unremarkable.     BONES:  No aggressive appearing lytic or blastic bony lesion.     IMPRESSION:  Subcutaneous soft tissue swelling of the right premandibular soft tissue,  likely related to cellulitis, with a 2.4 cm abscess in the subcutaneous soft  tissue.     Abscess in the right mandible at the root of right mandibular 1st molar  tooth, with cortical defect at the buccal surface of the right mandible  adjacent to the soft tissue abscess. EKG  None    All EKG's are interpreted by the Emergency Department Physician who either signs or Co-signs this chart in the absence of a cardiologist.    EMERGENCY DEPARTMENT COURSE/IMPRESSION: 72-year-old female 21 weeks pregnant presenting with right facial swelling and mass which is been present however been worsening over the last several days. No airway involvement. Concern for tracking, CT scan of face was obtained which does show abscess with cortical involvement. Basic labs were unremarkable. We do not have OMFS here, spoke to [address] [place] [address],[address], Dr. Blanche Lee excepted patient will plan to send via ambulance, patient was given IV clindamycin. Patient initially went leave AMA as she did not have anyone to take care of her kids however patient was able to find a family reticular of her cat and since been going on so long evening where she was he was seen care of. We will plan to send all of chart as well as images. PROCEDURES:  None    CONSULTS:  None    CRITICAL CARE:  None    FINAL IMPRESSION      1.  Abscess          DISPOSITION / PLAN     DISPOSITION Decision To Transfer 03/21/2022 02:11:12 PM      PATIENT REFERRED TO:  Ynes Damon, APRN - [facility]  [address] [facility]  [phone]    In 2 days      [facility]  [address] [address]  [phone]    As needed, If symptoms worsen    [address] [facility] Of Oral Maxillofacial Surgery / Dentistry  [facility] [address] [facility]    Call today        DISCHARGE MEDICATIONS:  New Prescriptions    CLINDAMYCIN (CLEOCIN) 300 MG CAPSULE    Take 1 capsule by mouth 4 times daily for 10 days       Dede Stanford DO  Emergency Medicine Resident    (Please note that portions of thisnote were completed with a voice recognition program.  Efforts were made to edit the dictations but occasionally words are mis-transcribed.)     Dede Stanford DO  Resident  03/21/22 0381

## 2022-03-21 NOTE — ED NOTES
SW and ED CM consulted about patient as patient may need a follow-up appointment at the [address] [place] [address] due to dental pain and need for extractions. Per report of Dr. Mario Davies, patient's oral needs may be greater than the Dental Center can accommodate. Nikole Owens, and Dr. Mario Davies spoke with patient about options and patient is now agreeable to a transfer to [facility] for care. Senait Hendrix.  [address], [address]  03/21/22 2553

## 2022-03-21 NOTE — ED PROVIDER NOTES
[address] [facility]  Emergency Department  Emergency Medicine Resident Sign-out     Care of Shiva Herron was assumed from Dr. Tyron Vyas and is being seen for Abscess (right lower cheek)  . The patient's initial evaluation and plan have been discussed with the prior provider who initially evaluated the patient. EMERGENCY DEPARTMENT COURSE / MEDICAL DECISION MAKING:       MEDICATIONS GIVEN:  Orders Placed This Encounter   Medications    acetaminophen (TYLENOL) tablet 1,000 mg    iopamidol (ISOVUE-370) 76 % injection 75 mL    clindamycin (CLEOCIN) 300 MG capsule     Sig: Take 1 capsule by mouth 4 times daily for 10 days     Dispense:  40 capsule     Refill:  0    clindamycin (CLEOCIN) IVPB 300 mg     Order Specific Question:   Antimicrobial Indications     Answer:   Skin and Soft Tissue Infection       LABS / RADIOLOGY:     Labs Reviewed   CBC - Abnormal; Notable for the following components:       Result Value    RBC 2.62 (*)     Hemoglobin 8.0 (*)     Hematocrit 23.8 (*)     All other components within normal limits   BASIC METABOLIC PANEL - Abnormal; Notable for the following components:    Calcium 8.5 (*)     Sodium 133 (*)     Potassium 3.5 (*)     All other components within normal limits       CT SOFT TISSUE NECK W CONTRAST    Result Date: 3/21/2022  EXAMINATION: CT OF THE NECK SOFT TISSUE WITH CONTRAST  3/21/2022 TECHNIQUE: CT of the neck was performed with the administration of intravenous contrast. Multiplanar reformatted images are provided for review. Dose modulation, iterative reconstruction, and/or weight based adjustment of the mA/kV was utilized to reduce the radiation dose to as low as reasonably achievable. COMPARISON: None.  HISTORY: ORDERING SYSTEM PROVIDED HISTORY: right sided facial swelling TECHNOLOGIST PROVIDED HISTORY: right sided facial swelling Decision Support Exception - unselect if not a suspected or confirmed emergency medical condition->Emergency Medical Condition (MA) Reason for Exam: right sided facial swelling FINDINGS: PHARYNX/LARYNX:  The palatine tonsils are normal in appearance. The tongue is normal in appearance. The valleculae, epiglottis, aryepiglottic folds and pyriform sinuses appear unremarkable. The true and false vocal cords are normal in appearance. No mass or abscess is seen. SALIVARY GLANDS/THYROID:  The parotid and submandibular glands appear unremarkable. The thyroid gland appears unremarkable. LYMPH NODES:  No cervical or supraclavicular lymphadenopathy is seen. SOFT TISSUES:  There is subcutaneous soft tissue swelling of the right premandibular soft tissue, likely related to cellulitis, with a 2.4 cm abscess in the subcutaneous soft tissue. There is scattered dental disease. There is abscess in the right mandible, at the root of right mandibular 1st molar tooth, with cortical defect at the buccal surface, adjacent to the soft tissue abscess. BRAIN/ORBITS/SINUSES:  The visualized portion of the intracranial contents appear unremarkable. The visualized portion of the orbits, paranasal sinuses and mastoid air cells demonstrate no acute abnormality. LUNG APICES/SUPERIOR MEDIASTINUM:  No focal consolidation is seen within the visualized lung apices. No superior mediastinal lymphadenopathy or mass. The visualized portion of the trachea appears unremarkable. BONES:  No aggressive appearing lytic or blastic bony lesion. Subcutaneous soft tissue swelling of the right premandibular soft tissue, likely related to cellulitis, with a 2.4 cm abscess in the subcutaneous soft tissue. Abscess in the right mandible at the root of right mandibular 1st molar tooth, with cortical defect at the buccal surface of the right mandible adjacent to the soft tissue abscess. RECENT VITALS:     Temp: 98.4 °F (36.9 °C),  Pulse: 94, Resp: 14, BP: (!) 110/59, SpO2: 98 %    This patient is a 22 y.o. Female with large oral/dental abscess.   Patient was given antibiotics for initial treatment however the cellulitis continued get worse. [facility] was unable to evaluate the patient. Patient be transferred emergency [place] for illness specialty to evaluate and treat. Patient will be leaving at 8:30 PM.    ED Course as of 03/21/22 1949   Mon Mar 21, 2022   1347 Data patient's on finding of exam, notify patient that we do not have OMFS capabilities here and will need to transfer out. Patient states that she is able do that if she is having to  her kids. Patient understands that she has an abscess here and this could continue to worsen and could lead to airway compromise, worsening infection and even death. Patient has clinicals making capacity. Patient states this is been going on for many months and understands that she needs to get it taken care of but cannot do it today. Patient states that she would drive up to [address] [facility] when she can take care of her home situation. Patient understands she is leaving [address] [WG]   1406 Patient was able to get someone to watch her kids, and now does not be transferring from [place], will contact transfer center. [WG]      ED Course User Index  [WG] Aly Berg,        OUTSTANDING TASKS / RECOMMENDATIONS:    1. Await transfer     FINAL IMPRESSION:     1.  Abscess        DISPOSITION:         DISPOSITION:  []  Discharge   [x]  Transfer -    []  Admission -     []  Against Medical Advice   []  Eloped   FOLLOW-UP: TERESA Trammell - [place]  [address] [facility]  [phone]    In 2 days      [facility]  [address] [facility]  [phone]    As needed, If symptoms worsen    [address] Oral Maxillofacial Surgery / Dentistry  [facility]  [address] [facility]    Call today       DISCHARGE MEDICATIONS: New Prescriptions    CLINDAMYCIN (CLEOCIN) 300 MG CAPSULE    Take 1 capsule by mouth 4 times daily for 10 days           Angelina Clarke MD  Emergency Medicine Resident  [facility]        Angelina Clarke MD  Resident  03/21/22 Merlinda Poot

## 2022-03-21 NOTE — ED PROVIDER NOTES
[facility] ED  eMERGENCY dEPARTMENT eNCOUnter   Attending Attestation     Pt Name: Paras Ramos  MRN: [id number]  [place] 2000  Date of evaluation: 3/21/22       Paras Ramos is a 22 y.o. female who presents with Abscess (right lower cheek)      History: Patient presents with abscess over the right face. Patient says it has been there for 4 days to this writer. Patient has history of similar. Patient does have hardware in her jaw. Exam: Heart rate and rhythm are regular. Lungs are clear to auscultation bilaterally. Abdomen is soft, nontender. Patient has significant abscess over the right mandible. Plan for CT, bedside ultrasound, patient will likely need OMFS if there is bone involvement. Plan to start antibiotics here. Plan for transfer. I performed a history and physical examination of the patient and discussed management with the resident. I reviewed the residents note and agree with the documented findings and plan of care. Any areas of disagreement are noted on the chart. I was personally present for the key portions of any procedures. I have documented in the chart those procedures where I was not present during the key portions. I have personally reviewed all images and agree with the resident's interpretation. I have reviewed the emergency nurses triage note. I agree with the chief complaint, past medical history, past surgical history, allergies, medications, social and family history as documented unless otherwise noted below. Documentation of the HPI, Physical Exam and Medical Decision Making performed by medical students or scribes is based on my personal performance of the HPI, PE and MDM. For Phys Assistant/ Nurse Practitioner cases/documentation I have had a face to face evaluation of this patient and have completed at least one if not all key elements of the E/M (history, physical exam, and MDM). Additional findings are as noted.     For APC cases I have personally evaluated and examined the patient in conjunction with the APC and agree with the treatment plan and disposition of the patient as recorded by the APC.     Coby Fields MD  Attending Emergency  Physician       Steven Farr MD  03/21/22 2753

## 2022-03-22 NOTE — ED NOTES
Transfer report given to EMS ([facility]).       Bernie Pérez RN  03/21/22 0264

## 2022-03-22 NOTE — ED NOTES
Pt provided pain meds, nausea meds, and a warm blanket. IV intact, flushes and draws well. Denies any other needs at this time. Call light within reach.       Carole Montes RN  03/21/22 2011

## 2022-03-25 ENCOUNTER — HOSPITAL ENCOUNTER (EMERGENCY)
Age: 22
Discharge: HOME | End: 2022-03-25
Attending: EMERGENCY MEDICINE
Payer: MEDICARE

## 2022-03-25 ENCOUNTER — APPOINTMENT (OUTPATIENT)
Dept: GENERAL RADIOLOGY | Age: 22
End: 2022-03-25
Payer: MEDICARE

## 2022-03-25 VITALS
HEIGHT: 63 IN | OXYGEN SATURATION: 98 % | DIASTOLIC BLOOD PRESSURE: 60 MMHG | SYSTOLIC BLOOD PRESSURE: 105 MMHG | HEART RATE: 77 BPM | BODY MASS INDEX: 24.8 KG/M2 | WEIGHT: 140 LBS | TEMPERATURE: 98.3 F | RESPIRATION RATE: 18 BRPM

## 2022-03-25 DIAGNOSIS — R07.89 ATYPICAL CHEST PAIN: Primary | ICD-10-CM

## 2022-03-25 LAB
ABSOLUTE EOS #: 0.2 K/UL (ref 0–0.44)
ABSOLUTE IMMATURE GRANULOCYTE: <0.03 K/UL (ref 0–0.3)
ABSOLUTE LYMPH #: 1.79 K/UL (ref 1.2–5.2)
ABSOLUTE MONO #: 0.54 K/UL (ref 0.1–1.4)
ALBUMIN SERPL-MCNC: 3.3 G/DL (ref 3.5–5.2)
ALBUMIN/GLOBULIN RATIO: 1.1 (ref 1–2.5)
ALP BLD-CCNC: 64 U/L (ref 35–104)
ALT SERPL-CCNC: 7 U/L (ref 5–33)
ANION GAP SERPL CALCULATED.3IONS-SCNC: 10 MMOL/L (ref 9–17)
AST SERPL-CCNC: 11 U/L
BASOPHILS # BLD: 1 % (ref 0–2)
BASOPHILS ABSOLUTE: 0.03 K/UL (ref 0–0.2)
BILIRUB SERPL-MCNC: 0.17 MG/DL (ref 0.3–1.2)
BUN BLDV-MCNC: 8 MG/DL (ref 6–20)
CALCIUM SERPL-MCNC: 8.3 MG/DL (ref 8.6–10.4)
CHLORIDE BLD-SCNC: 107 MMOL/L (ref 98–107)
CO2: 20 MMOL/L (ref 20–31)
CREAT SERPL-MCNC: 0.44 MG/DL (ref 0.5–0.9)
EOSINOPHILS RELATIVE PERCENT: 3 % (ref 1–4)
GFR AFRICAN AMERICAN: >60 ML/MIN
GFR NON-AFRICAN AMERICAN: >60 ML/MIN
GFR SERPL CREATININE-BSD FRML MDRD: ABNORMAL ML/MIN/{1.73_M2}
GLUCOSE BLD-MCNC: 88 MG/DL (ref 70–99)
HCT VFR BLD CALC: 22.7 % (ref 36.3–47.1)
HEMOGLOBIN: 7.5 G/DL (ref 11.9–15.1)
IMMATURE GRANULOCYTES: 0 %
LIPASE: 23 U/L (ref 13–60)
LYMPHOCYTES # BLD: 30 % (ref 25–45)
MCH RBC QN AUTO: 30.2 PG (ref 25.2–33.5)
MCHC RBC AUTO-ENTMCNC: 33 G/DL (ref 28.4–34.8)
MCV RBC AUTO: 91.5 FL (ref 82.6–102.9)
MONOCYTES # BLD: 9 % (ref 2–8)
NRBC AUTOMATED: 0 PER 100 WBC
PDW BLD-RTO: 13 % (ref 11.8–14.4)
PLATELET # BLD: 216 K/UL (ref 138–453)
PMV BLD AUTO: 10 FL (ref 8.1–13.5)
POTASSIUM SERPL-SCNC: 4 MMOL/L (ref 3.7–5.3)
RBC # BLD: 2.48 M/UL (ref 3.95–5.11)
SEG NEUTROPHILS: 57 % (ref 34–64)
SEGMENTED NEUTROPHILS ABSOLUTE COUNT: 3.42 K/UL (ref 1.8–8)
SODIUM BLD-SCNC: 137 MMOL/L (ref 135–144)
TOTAL PROTEIN: 6.2 G/DL (ref 6.4–8.3)
TROPONIN, HIGH SENSITIVITY: <6 NG/L (ref 0–14)
WBC # BLD: 6 K/UL (ref 4.5–13.5)

## 2022-03-25 PROCEDURE — 71046 X-RAY EXAM CHEST 2 VIEWS: CPT

## 2022-03-25 PROCEDURE — 93005 ELECTROCARDIOGRAM TRACING: CPT | Performed by: HEALTH CARE PROVIDER

## 2022-03-25 PROCEDURE — 84484 ASSAY OF TROPONIN QUANT: CPT

## 2022-03-25 PROCEDURE — 80053 COMPREHEN METABOLIC PANEL: CPT

## 2022-03-25 PROCEDURE — 99285 EMERGENCY DEPT VISIT HI MDM: CPT

## 2022-03-25 PROCEDURE — 85025 COMPLETE CBC W/AUTO DIFF WBC: CPT

## 2022-03-25 PROCEDURE — 83690 ASSAY OF LIPASE: CPT

## 2022-03-25 PROCEDURE — 6370000000 HC RX 637 (ALT 250 FOR IP): Performed by: HEALTH CARE PROVIDER

## 2022-03-25 RX ORDER — OXYCODONE HYDROCHLORIDE 5 MG/1
5 TABLET ORAL ONCE
Status: COMPLETED | OUTPATIENT
Start: 2022-03-25 | End: 2022-03-25

## 2022-03-25 RX ORDER — OXYCODONE HYDROCHLORIDE AND ACETAMINOPHEN 5; 325 MG/1; MG/1
1 TABLET ORAL ONCE
Status: ON HOLD | COMMUNITY
End: 2022-04-14 | Stop reason: HOSPADM

## 2022-03-25 RX ORDER — CLINDAMYCIN HYDROCHLORIDE 150 MG/1
300 CAPSULE ORAL ONCE
Status: COMPLETED | OUTPATIENT
Start: 2022-03-25 | End: 2022-03-25

## 2022-03-25 RX ADMIN — OXYCODONE 5 MG: 5 TABLET ORAL at 10:21

## 2022-03-25 RX ADMIN — CLINDAMYCIN HYDROCHLORIDE 300 MG: 150 CAPSULE ORAL at 10:21

## 2022-03-25 ASSESSMENT — PAIN - FUNCTIONAL ASSESSMENT: PAIN_FUNCTIONAL_ASSESSMENT: 0-10

## 2022-03-25 ASSESSMENT — ENCOUNTER SYMPTOMS
NAUSEA: 0
ABDOMINAL PAIN: 0
SORE THROAT: 0
SHORTNESS OF BREATH: 0
VOMITING: 0
CONSTIPATION: 0
DIARRHEA: 0
COUGH: 0

## 2022-03-25 ASSESSMENT — PAIN SCALES - GENERAL
PAINLEVEL_OUTOF10: 10
PAINLEVEL_OUTOF10: 10

## 2022-03-25 ASSESSMENT — PAIN DESCRIPTION - LOCATION: LOCATION: CHEST;JAW

## 2022-03-25 ASSESSMENT — PAIN DESCRIPTION - ORIENTATION: ORIENTATION: RIGHT

## 2022-03-25 ASSESSMENT — PAIN DESCRIPTION - PAIN TYPE: TYPE: ACUTE PAIN

## 2022-03-25 NOTE — ED PROVIDER NOTES
[address] [facility]     Emergency Department     Faculty Attestation    I performed a history and physical examination of the patient and discussed management with the resident. I reviewed the resident´s note and agree with the documented findings and plan of care. Any areas of disagreement are noted on the chart. I was personally present for the key portions of any procedures. I have documented in the chart those procedures where I was not present during the key portions. I have reviewed the emergency nurses triage note. I agree with the chief complaint, past medical history, past surgical history, allergies, medications, social and family history as documented unless otherwise noted below. For Physician Assistant/ Nurse Practitioner cases/documentation I have personally evaluated this patient and have completed at least one if not all key elements of the E/M (history, physical exam, and MDM). Additional findings are as noted. Patient had surgery on right jaw several days ago in [facility], currently on antibiotics, the patient is 21 weeks pregnant, complaining of mid anterior chest pain, the pain does not radiate to the back, the pain is not pleuritic. On exam the patient has equal breath sounds, heart regular rate and rhythm, careful auscultation revealed no murmurs. Abdomen soft and nontender, gravid uterus present. Patient has equal pulses at the wrists. The surgical site on the right jaw is erythematous and swollen. Jonn Sanches MD  03/25/22 1003       EKG Interpretation    Interpreted by emergency department physician    Rhythm: normal sinus with sinus arrhythmia  Rate: normal/67  Axis: normal/60  Ectopy: none  Conduction: normal  ST Segments: no acute change  T Waves: no acute change  Q Waves: none    Further exam shows no lower extremity pain or swelling on examination. The patient's chest pain was reproduced by palpation of the left and right parasternal ribs.   Clinical Impression: Normal EKG    Jackie Layne MD  03/25/22 1031       Dimple Theodore MD  03/25/22 1041

## 2022-03-25 NOTE — ED PROVIDER NOTES
[address] [facility]  ED  Emergency Department Encounter  Emergency Medicine Resident     Pt Name: Soraya Fountain  MRN: [id number]  [place] 2000  Date of evaluation: 3/25/22  PCP:  Saurav Fuller       Chief Complaint   Patient presents with    Chest Pain     Chest pain that radiates to right arm       HISTORY OFPRESENT ILLNESS  (Location/Symptom, Timing/Onset, Context/Setting, Quality, Duration, Modifying Factors,Severity.)      Soraya Fountain is a 22 y.o. female who presents with acute mid to the right sided sharp chest pain. Patient states that she was woken up by acute onset of chest pain this morning. Did initially subside but it has been constant since onset. Feels like is worse when she is lying down, better when she is sitting forward. Patient is currently 5 months pregnant. Feels like the baby is moving. Denies any vaginal bleeding or discharge    Of note, patient did just have surgery 3 days ago for submandibular abscess, as well as extraction of #30, and hardware removal of her intermaxillary fixation device. Was discharged on Roxicodone and clindamycin. Patient did not take her medications this morning before coming in. Was discharged two days ago on 3/23/22. Has follow up on 3/28/22. Denies any headaches, dizziness, shortness of breath, nausea, weakness. PAST MEDICAL / SURGICAL / SOCIAL / FAMILY HISTORY      has a past medical history of ADHD (attention deficit hyperactivity disorder), Allergic, and Seizures ([facility] [address] has no past surgical history on file.     Social History     Socioeconomic History    Marital status: Single     Spouse name: Not on file    Number of children: 1    Years of education: Not on file    Highest education level: Not on file   Occupational History    Not on file   Tobacco Use    Smoking status: Former Smoker    Smokeless tobacco: Never Used    Tobacco comment: off and on   Vaping Use    Vaping Use: Never used   Substance and Sexual Activity    Alcohol use: Not Currently     Comment: Socially    Drug use: Not Currently     Comment: quit 10/28/2019    Sexual activity: Yes     Partners: Male   Other Topics Concern    Not on file   Social History Narrative    Not on file     Social Determinants of Health     Financial Resource Strain:     Difficulty of Paying Living Expenses: Not on file   Food Insecurity:     Worried About Running Out of Food in the Last Year: Not on file    Barbara of Food in the Last Year: Not on file   Transportation Needs:     Lack of Transportation (Medical): Not on file    Lack of Transportation (Non-Medical): Not on file   Physical Activity:     Days of Exercise per Week: Not on file    Minutes of Exercise per Session: Not on file   Stress:     Feeling of Stress : Not on file   Social Connections:     Frequency of Communication with Friends and Family: Not on file    Frequency of Social Gatherings with Friends and Family: Not on file    Attends Bahai Services: Not on file    Active Member of [address] [place] [address] [employer] or Organizations: Not on file    Attends Club or Organization Meetings: Not on file    Marital Status: Not on file   Intimate Partner Violence:     Fear of Current or Ex-Partner: Not on file    Emotionally Abused: Not on file    Physically Abused: Not on file    Sexually Abused: Not on file   Housing Stability:     Unable to Pay for Housing in the Last Year: Not on file    Number of [place] in the Last Year: Not on file    Unstable Housing in the Last Year: Not on file       Family History   Problem Relation Age of Onset    Stroke Maternal Grandmother     High Blood Pressure Maternal Grandmother     Diabetes Maternal Grandfather        Allergies:  Amoxicillin and Augmentin [amoxicillin-pot clavulanate]    Home Medications:  Prior to Admission medications    Medication Sig Start Date End Date Taking?  Authorizing Provider   oxyCODONE-acetaminophen (PERCOCET) 5-325 MG per tablet Take 1 tablet by mouth once. Yes Historical Provider, MD   famotidine (PEPCID) 20 MG tablet  3/7/22   Historical Provider, MD   ondansetron (ZOFRAN-ODT) 4 MG disintegrating tablet Take 4 mg by mouth every 8 hours as needed 2/8/22   Historical Provider, MD   clindamycin (CLEOCIN) 300 MG capsule Take 1 capsule by mouth 4 times daily for 10 days 3/21/22 3/31/22  Rolly Kilgore, DO       REVIEW OF SYSTEMS    (2-9 systems for level 4, 10 or more for level 5)      Review of Systems   Constitutional: Negative for chills and fever. HENT: Negative for ear pain, hearing loss and sore throat. Eyes: Negative for visual disturbance. Respiratory: Negative for cough and shortness of breath. Cardiovascular: Positive for chest pain. Negative for palpitations and leg swelling. Gastrointestinal: Negative for abdominal pain, constipation, diarrhea, nausea and vomiting. Genitourinary: Negative for difficulty urinating and dysuria. Musculoskeletal: Negative for arthralgias and myalgias. Neurological: Negative for numbness. Psychiatric/Behavioral: Negative for agitation and confusion. PHYSICAL EXAM   (up to 7 for level 4, 8 or more for level 5)     INITIAL VITALS:    height is 5' 3\" (1.6 m) and weight is 140 lb (63.5 kg). Her oral temperature is 98.3 °F (36.8 °C). Her blood pressure is 105/60 and her pulse is 77. Her respiration is 18 and oxygen saturation is 98%. Physical Exam  Vitals and nursing note reviewed. Constitutional:       General: She is not in acute distress. Appearance: She is well-developed. She is not diaphoretic. HENT:      Head: Normocephalic and atraumatic. Jaw: Swelling present.         Comments: Uvula midline, no swelling of tongue, floor of mouth soft, no deviation of uvula, no unilateral swelling of tonsils     Right Ear: External ear normal.      Left Ear: External ear normal.      Nose: Nose normal.   Eyes:      Conjunctiva/sclera: Conjunctivae normal.   Neck:      Trachea: No tracheal deviation. Cardiovascular:      Rate and Rhythm: Normal rate and regular rhythm. Heart sounds: Normal heart sounds. No murmur heard. No friction rub. No gallop. Pulmonary:      Effort: Pulmonary effort is normal. No respiratory distress. Breath sounds: Normal breath sounds. Abdominal:      General: Bowel sounds are normal.      Palpations: Abdomen is soft. Tenderness: There is no abdominal tenderness. Musculoskeletal:         General: No tenderness. Normal range of motion. Cervical back: Neck supple. Skin:     General: Skin is warm and dry. Capillary Refill: Capillary refill takes less than 2 seconds. Neurological:      Mental Status: She is alert and oriented to person, place, and time. Motor: No abnormal muscle tone. DIFFERENTIAL  DIAGNOSIS     PLAN (LABS / IMAGING / EKG):  Orders Placed This Encounter   Procedures    XR CHEST (2 VW)    CBC with Auto Differential    Comprehensive Metabolic Panel w/ Reflex to MG    Lipase    Troponin    EKG 12 Lead    Saline lock IV    Insert peripheral IV       MEDICATIONS ORDERED:  Orders Placed This Encounter   Medications    clindamycin (CLEOCIN) capsule 300 mg     Order Specific Question:   Antimicrobial Indications     Answer:   Head and Neck Infection    oxyCODONE (ROXICODONE) immediate release tablet 5 mg       DDX: Dissection, pericarditis, endocarditis    Initial MDM/Plan: 22 y.o. female who presents with concern for acute right-sided chest pain that woke patient from sleep. Patient is 5 months pregnant this time. Tenderness examination patient was in no acute distress, vital signs were stable. She was nontachycardic. Physical exam was pertinent for some reproducible chest pain. Bedside ultrasound demonstrated a fetal heart rate of average 150s. No concern for true diffuse issues at this time.   Patient has been recently undergoing treatment for submandibular abscess, recently incidentally time. Concerns for dissection, endocarditis. No new murmurs heard on physical exam.  Will plan for chest pain work-up, chest x-ray, EKG, CMP is patient did have some possible epigastric tenderness noted initial exam.    DIAGNOSTIC RESULTS / EMERGENCY DEPARTMENT COURSE / MDM     LABS:  Labs Reviewed   CBC WITH AUTO DIFFERENTIAL - Abnormal; Notable for the following components:       Result Value    RBC 2.48 (*)     Hemoglobin 7.5 (*)     Hematocrit 22.7 (*)     Monocytes 9 (*)     All other components within normal limits   COMPREHENSIVE METABOLIC PANEL W/ REFLEX TO MG FOR LOW K - Abnormal; Notable for the following components:    CREATININE 0.44 (*)     Calcium 8.3 (*)     Total Bilirubin 0.17 (*)     Total Protein 6.2 (*)     Albumin 3.3 (*)     All other components within normal limits   LIPASE   TROPONIN         RADIOLOGY:  XR CHEST (2 VW)    Result Date: 3/25/2022  EXAMINATION: TWO XRAY VIEWS OF THE CHEST 3/25/2022 10:03 am COMPARISON: None. HISTORY: ORDERING SYSTEM PROVIDED HISTORY: chest pain FINDINGS: The lungs are without acute focal process. No effusion or pneumothorax. The cardiomediastinal silhouette is normal.  The osseous structures are intact without acute process.      Negative chest.       EKG  EKG Interpretation    Interpreted by me    Rhythm: normal sinus   Rate: normal  Axis: normal  Ectopy: none  Conduction: normal  ST Segments: no acute change  T Waves: no acute change  Q Waves: none    Clinical Impression: no acute changes and normal EKG    All EKG's are interpreted by the Emergency Department Physician who either signs or Co-signs this chart in the absence of a cardiologist.    EMERGENCY DEPARTMENT COURSE:  ED Course as of 03/25/22 2035   Fri Mar 25, 2022   1016 CBC with Auto Differential(!):    WBC 6.0   RBC 2.48(!)   Hemoglobin Quant 7.5(!)   Hematocrit 22.7(!)   MCV 91.5   MCH 30.2   MCHC 33.0   RDW 13.0   Platelet Count 276   MPV 10.0   NRBC Automated 0.0 Seg Neutrophils 57   Lymphocytes 30   Monocytes 9(!)   Eosinophils % 3   Basophils 1   Immature Granulocytes 0   Segs Absolute 3.42   Absolute Lymph # 1.79   Absolute Mono # 0.54   Absolute Eos # 0.20   Basophils Absolute 0.03   Absolute Immature Granulocyte <0.03  HgB 7.5, baseline around 8-8.5. [JS]   1031 XR CHEST (2 VW)  XR negative. Trop <6. CMP wnl. [JS]   2030 Laboratory work-up overall unremarkable. Patient's pain improved with the Roxicodone while in the emergency department. Suspect likely musculoskeletal in origin as well as reproducible for the same pain on exam.  Will plan for discharge at this time. Patient can follow-up with her oral surgeon as scheduled, no concern for any worsening infection as patient is been afebrile and has normal white count at this time. Follow up plans and ER return precautions discussed. Patient verbalized understanding and had no further questions at time of discharge. [JS]      ED Course User Index  [JS] Yandel Pham DO       PROCEDURES:  None    CONSULTS:  None    CRITICAL CARE:  Please see attending note    FINAL IMPRESSION      1. Atypical chest pain        DISPOSITION / PLAN     DISPOSITION Decision To Discharge 03/25/2022 10:58:00 AM    PATIENTREFERRED TO:  TERESA Whitaker - [facility]  [address] [facility]  [phone]    Call in 1 day  To discuss this emergency room visit any follow-up needed.     [facility] ED  [address] [facility] [address]  [phone]  Go to   As needed, If symptoms worsen      DISCHARGE MEDICATIONS:  Discharge Medication List as of 3/25/2022 11:01 AM          Macrina Shen DO  EmergencyMedicine Resident    (Please note that portions of this note were completed with a voice recognition program.  Efforts were made to edit the dictations but occasionally words are mis-transcribed.)     Yandel Pham DO  Resident  03/25/22 2036

## 2022-03-25 NOTE — ED NOTES
Pt to ER with c/o chest pain that radiates to right arm. Pt states she had an abscess removed 2 days ago in [place] and they hit a nerve. Pt has right jaw pain from the surgery. Pt rates pain in jaw 10/10. Pt reports a cough from the surgery that produces mucus and chest discomfort. Pt is 5 months pregnant. Pt placed on full monitor. No acute distress noted and RR even and NL. Dr. Nan Rose at bedside to evaluate pt.       Priya Jefferson  03/25/22 9350

## 2022-03-26 LAB
EKG ATRIAL RATE: 67 BPM
EKG P AXIS: 36 DEGREES
EKG P-R INTERVAL: 146 MS
EKG Q-T INTERVAL: 410 MS
EKG QRS DURATION: 74 MS
EKG QTC CALCULATION (BAZETT): 433 MS
EKG R AXIS: 60 DEGREES
EKG T AXIS: 35 DEGREES
EKG VENTRICULAR RATE: 67 BPM

## 2022-03-26 PROCEDURE — 93010 ELECTROCARDIOGRAM REPORT: CPT | Performed by: INTERNAL MEDICINE

## 2022-03-29 ENCOUNTER — HOSPITAL ENCOUNTER (OUTPATIENT)
Age: 22
Discharge: HOME | End: 2022-03-29
Attending: OBSTETRICS & GYNECOLOGY | Admitting: OBSTETRICS & GYNECOLOGY
Payer: MEDICARE

## 2022-03-29 ENCOUNTER — HOSPITAL ENCOUNTER (EMERGENCY)
Age: 22
Discharge: LEFT BEFORE DISCHARGE | End: 2022-03-29

## 2022-03-29 VITALS
TEMPERATURE: 98 F | HEART RATE: 85 BPM | SYSTOLIC BLOOD PRESSURE: 105 MMHG | RESPIRATION RATE: 16 BRPM | DIASTOLIC BLOOD PRESSURE: 52 MMHG

## 2022-03-29 PROBLEM — Z28.39 MATERNAL VARICELLA, NON-IMMUNE: Status: ACTIVE | Noted: 2022-03-29

## 2022-03-29 PROBLEM — Z28.39 RUBELLA NON-IMMUNE STATUS, ANTEPARTUM: Status: ACTIVE | Noted: 2022-03-29

## 2022-03-29 PROBLEM — O09.92 HIGH-RISK PREGNANCY IN SECOND TRIMESTER: Status: ACTIVE | Noted: 2022-03-29

## 2022-03-29 PROBLEM — O09.899 MATERNAL VARICELLA, NON-IMMUNE: Status: ACTIVE | Noted: 2022-03-29

## 2022-03-29 PROBLEM — O09.899 RUBELLA NON-IMMUNE STATUS, ANTEPARTUM: Status: ACTIVE | Noted: 2022-03-29

## 2022-03-29 LAB
-: ABNORMAL
BACTERIA: ABNORMAL
BILIRUBIN URINE: NEGATIVE
C TRACH DNA GENITAL QL NAA+PROBE: NEGATIVE
CANDIDA SPECIES, DNA PROBE: POSITIVE
CASTS UA: ABNORMAL /LPF (ref 0–8)
COLOR: YELLOW
CULTURE: NORMAL
EPITHELIAL CELLS UA: ABNORMAL /HPF (ref 0–5)
GARDNERELLA VAGINALIS, DNA PROBE: POSITIVE
GLUCOSE URINE: NEGATIVE
KETONES, URINE: NEGATIVE
LEUKOCYTE ESTERASE, URINE: ABNORMAL
N. GONORRHOEAE DNA: NEGATIVE
NITRITE, URINE: NEGATIVE
PH UA: 8 (ref 5–8)
PROTEIN UA: NEGATIVE
RBC UA: ABNORMAL /HPF (ref 0–4)
SOURCE: ABNORMAL
SPECIFIC GRAVITY UA: 1.02 (ref 1–1.03)
SPECIMEN DESCRIPTION: NORMAL
SPECIMEN DESCRIPTION: NORMAL
TRICHOMONAS VAGINALIS DNA: NEGATIVE
TURBIDITY: ABNORMAL
URINE HGB: NEGATIVE
UROBILINOGEN, URINE: NORMAL
WBC UA: ABNORMAL /HPF (ref 0–5)

## 2022-03-29 PROCEDURE — 87491 CHLMYD TRACH DNA AMP PROBE: CPT

## 2022-03-29 PROCEDURE — 87480 CANDIDA DNA DIR PROBE: CPT

## 2022-03-29 PROCEDURE — 87510 GARDNER VAG DNA DIR PROBE: CPT

## 2022-03-29 PROCEDURE — 87591 N.GONORRHOEAE DNA AMP PROB: CPT

## 2022-03-29 PROCEDURE — 81001 URINALYSIS AUTO W/SCOPE: CPT

## 2022-03-29 PROCEDURE — 87660 TRICHOMONAS VAGIN DIR PROBE: CPT

## 2022-03-29 PROCEDURE — 6370000000 HC RX 637 (ALT 250 FOR IP): Performed by: STUDENT IN AN ORGANIZED HEALTH CARE EDUCATION/TRAINING PROGRAM

## 2022-03-29 PROCEDURE — 99213 OFFICE O/P EST LOW 20 MIN: CPT

## 2022-03-29 PROCEDURE — 87086 URINE CULTURE/COLONY COUNT: CPT

## 2022-03-29 RX ORDER — METRONIDAZOLE 500 MG/1
500 TABLET ORAL 2 TIMES DAILY
Qty: 14 TABLET | Refills: 0 | Status: SHIPPED | OUTPATIENT
Start: 2022-03-29 | End: 2022-04-05

## 2022-03-29 RX ORDER — ONDANSETRON 4 MG/1
4 TABLET, ORALLY DISINTEGRATING ORAL ONCE
Status: COMPLETED | OUTPATIENT
Start: 2022-03-29 | End: 2022-03-29

## 2022-03-29 RX ORDER — ACETAMINOPHEN 500 MG
1000 TABLET ORAL ONCE
Status: COMPLETED | OUTPATIENT
Start: 2022-03-29 | End: 2022-03-29

## 2022-03-29 RX ORDER — ONDANSETRON 4 MG/1
4 TABLET, FILM COATED ORAL DAILY PRN
Qty: 30 TABLET | Refills: 0 | Status: SHIPPED | OUTPATIENT
Start: 2022-03-29

## 2022-03-29 RX ORDER — ACETAMINOPHEN 500 MG
1000 TABLET ORAL 3 TIMES DAILY
Qty: 540 TABLET | Refills: 1 | Status: SHIPPED | OUTPATIENT
Start: 2022-03-29

## 2022-03-29 RX ORDER — FLUCONAZOLE 100 MG/1
100 TABLET ORAL DAILY
Qty: 1 TABLET | Refills: 0 | Status: SHIPPED | OUTPATIENT
Start: 2022-03-29 | End: 2022-03-30

## 2022-03-29 RX ADMIN — ONDANSETRON 4 MG: 4 TABLET, ORALLY DISINTEGRATING ORAL at 04:13

## 2022-03-29 RX ADMIN — ACETAMINOPHEN 1000 MG: 500 TABLET ORAL at 04:16

## 2022-03-29 ASSESSMENT — PAIN SCALES - GENERAL: PAINLEVEL_OUTOF10: 4

## 2022-03-29 NOTE — ED NOTES
..Patient in need of transportation home. SW contacted [facility]. ETA unknown.  #[phone]
Chemical Peel: Pigment Balancing
Detail Level: Zone
Prep: The treated area was degreased with pre-peel cleanser, and vaseline was applied for protection of mucous membranes.
Consent: Prior to the procedure, written consent was obtained and risks were reviewed, including but not limited to: redness, peeling, blistering, pigmentary change, scarring, infection, and pain.
Treatment Number: 0
Post Peel Care: After the procedure, phyto correct was applied to the treated areas. Sun protection and post-care instructions were reviewed with the patient.
Treatment Time (Optional): 5 min
Post-Care Instructions: I reviewed with the patient in detail post-care instructions. Patient should avoid sun exposure and wear sun protection.

## 2022-03-29 NOTE — H&P
[address] [facility] [place]    Date: 3/29/2022       Time: 3:41 AM   Patient Name: Serena Hauser     Patient : 2000  Room[facility]Bed: [facility]-    Admission Date/Time: 3/29/2022  3:36 AM      CC: Abdominal Pain and Nausea    HPI: Serena Hauser is a 22 y.o.  at 22w2d who presents from home with abdominal pain and nausea. She states that it started to get worse last night and this morning and that is what brought her in. She describes it as lower abdominal pain that radiates to her back and is constant in nature. She denies any leaking fluid or vaginal bleeding. She has positive fetal movement. She denies HA, vision changes, SOB, chest pain, Lightheadedness, dizziness. DATING:  LMP: Patient's last menstrual period was 2021.   Estimated Date of Delivery: 22   Based on: early ultrasound, at 8 1/7 weeks GA    PREGNANCY RISK FACTORS:  Patient Active Problem List   Diagnosis    GERD     Hx Seizures    ADHD    THC Use    Tobacco Use    Hx Chlamydia    Anemia        Steroids Given In This Pregnancy:  no     REVIEW OF SYSTEMS:   Constitutional: negative fever, negative chills, negative weight changes   HEENT: negative visual disturbances, negative headaches, negative dizziness, negative hearing loss  Breast: Negative breast abnormalities, negative breast lumps, negative nipple discharge  Respiratory: negative dyspnea, negative cough, negative SOB  Cardiovascular: negative chest pain,  negative palpitations, negative arrhythmia, negative syncope   Gastrointestinal: positive abdominal pain, negative RUQ pain, positive N/V, negative diarrhea, negative constipation, negative bowel changes, negative heartburn   Genitourinary: negative dysuria, negative hematuria, negative urinary incontinence, negative vaginal discharge, negative vaginal bleeding or spotting  Dermatological: negative rash, negative pruritis, negative mole or other skin changes  Hematologic: negative bruising  Immunologic/Lymphatic: negative recent illness, negative recent sick contact  Musculoskeletal: positive back pain, negative myalgias, negative arthralgias  Neurological:  negative dizziness, negative migraines, negative seizures, negative weakness  Behavior/Psych: negative depression, negative anxiety, negative SI, negative HI    OBSTETRICAL HISTORY:   OB History    Para Term  AB Living   4 3 3 0 0 3   SAB IAB Ectopic Molar Multiple Live Births   0 0 0 0 0 3      # Outcome Date GA Lbr Vito/2nd Weight Sex Delivery Anes PTL Lv   4 Current            3 Term 22 37w2d   F Vag-Spont   SADAF   2 Term 21 39w0d  7 lb 8 oz (3.402 kg) F Vag-Spont   SADAF   1 Term 17 40w1d   M Vag-Spont   SADAF       PAST MEDICAL HISTORY:   has a past medical history of ADHD (attention deficit hyperactivity disorder), Allergic, and Seizures ([facility] [address] PAST SURGICAL HISTORY:   has no past surgical history on file. ALLERGIES:  is allergic to amoxicillin and augmentin [amoxicillin-pot clavulanate]. MEDICATIONS:  Prior to Admission medications    Medication Sig Start Date End Date Taking? Authorizing Provider   oxyCODONE-acetaminophen (PERCOCET) 5-325 MG per tablet Take 1 tablet by mouth once. Historical Provider, MD   famotidine (PEPCID) 20 MG tablet  3/7/22   Historical Provider, MD   ondansetron (ZOFRAN-ODT) 4 MG disintegrating tablet Take 4 mg by mouth every 8 hours as needed 22   Historical Provider, MD   clindamycin (CLEOCIN) 300 MG capsule Take 1 capsule by mouth 4 times daily for 10 days 3/21/22 3/31/22  Faith Comment, DO       FAMILY HISTORY:  family history includes Diabetes in her maternal grandfather; High Blood Pressure in her maternal grandmother; Stroke in her maternal grandmother. SOCIAL HISTORY:   reports that she has quit smoking. She has never used smokeless tobacco. She reports previous alcohol use.  She reports previous drug use.    VITALS:  Vitals:    03/29/22 0339   BP: (!) 105/52   Pulse: 85   Resp: 16   Temp: 98 °F (36.7 °C)   TempSrc: Oral         PHYSICAL EXAM:  Fetal Heart Tones: 140    General appearance:  no apparent distress alert and cooperative  HEENT: head atraumatic, normocephalic, moist mucous membranes, trachea midline  Neurologic:  alert, oriented, normal speech, no focal findings or movement disorder noted  Lungs:  No increased work of breathing, good air exchange, clear to auscultation bilaterally, no crackles or wheezing  Heart:  regular rate and rhythm and no murmur, rubs, gallops  Abdomen:  soft, gravid, non-tender, no rebound, guarding, or rigidity, no RUQ or epigastric tenderness, no signs or symptoms of abruption, no signs or symptoms of chorioamnionitis  Extremities:  no calf tenderness, non edematous, no varicosities, full range of motion in all four extremities  Musculoskeletal: Gross strength equal and intact throughout, no gross abnormalities, range of motion normal in hips, knees, shoulders and spine, CVA tenderness: none  Psychiatric: Mood appropriate, normal affect   Rectal Exam: not indicated    Pelvic Exam:   Chaperone for Intimate Exam: Chaperone was present for entire exam, Chaperone Name: Christopher Hess RN    Sterile Speculum Exam:   Urethral meatus: normal appearing   Vulva: Normal hair distribution, normal appearing vulva, no masses, tenderness or lesions, normal clitoris   Vagina: Normal appearing vaginal mucosa without lesions, white vaginal discharge noted in the posterior vault, no lacerations   Cervix: Normal appearing cervix without lesions, external os visibly closed, no lacerations or abnormal lesions visualized      PRENATAL LAB RESULTS:   Blood Type/Rh: A pos  Antibody Screen: negative  Hemoglobin, Hematocrit, Platelets: 54/55.3/491  Rubella: equivocal  T.  Pallidum, IgG: non-reactive  Hepatitis B Surface Antigen: non-reactive   Hepatitis C Antibody: non-reactive   HIV: non-reactive   Sickle Cell Screen: not done  Gonorrhea: negative  Chlamydia: positive 3/6/22  Urine culture: not done,    Early 1 hour Glucose Tolerance Test: not done  Early 3 hour Glucose Tolerance Test: not done  1 hour Glucose Tolerance Test: not done  3 hour Glucose Tolerance Test: not done    Group B Strep: not done  Cystic Fibrosis Screen: not done  First Trimester Screen: not available  MSAFP/Multiple Markers:not done  Non-Invasive Prenatal Testing: not done  Anatomy US: not done    ASSESSMENT & PLAN:  Maya Cr is a 22 y.o. female  at 22w2d IUP   - GBS unknown / Rh positive / R equivocal   - No indication for GBS prophylaxis   - Will need MMR PP     Round Ligament Pain/Nausea   - Patient here from home with round ligament pain and nausea   - Describes her pain as constant lower abdominal pain that radiates to her back   - She has been seen multiple times between here and [facility] for the same complaint   - She has been non compliant with medication recommendations for her symptoms    - She has nausea that started today    - Positive fetal movement   - Negative leaking fluid or vaginal bleeding   - VSS, Afebrile              -  by doppler              - SSE:unremarkable, cervix is long thick and closed, no bleeding noted  - UA suspicious for dirty catch, Ucx pending   - Vaginitis Probe positive for BV and yeast  - Gc/C ordered   - PO hydration encouraged   - Tylenol for pain control    - Zofran for nausea   - She reports improvement in her    - She doesn't know when her next appointment is but states that she has the date written down at home   - Follows with [facility]   - Rx for abdominal binder sent   - Zofran and tylenol sent to her pharmacy   - Flagyl and diflucan sent to her pharmacy   - Will plan to DC at this time. Patient is aware that she should return to the hospital if she has worsening contractions, LOF, VB or decreased fetal movements.  She voices understanding    Tobacco Use   - Cessation encouraged    THC Use   - Cessation encouraged   - Had positive UDS this pregnancy    Hx Seizure   - Denies any medications   - Not following with neurology    Hx Chlamydia   - Positive 3/6/22   - Gc/C collected today    GERD    Anemia   - Will need repeat cbc around 28 weeks   - VSS    ADHD    - Stable no meds    BMI 24    Patient Active Problem List    Diagnosis Date Noted    Anemia 2020    Hx Chlamydia 2020     Positive on 10/18/19, 19, 20. Patient treated during admission on 20. Patient instructed to have partner treated and remain abstinent for 7 days following treatment. She will need ARTURO in 3-6 weeks.  THC Use 2020    Tobacco Use 2020    Hx Seizures     ADHD     GERD  2016     Rx pepcid given 16         Plan discussed with Dr. Narinder Alexandre, who is agreeable. Steroids given this admission: No    Risks, benefits, alternatives and possible complications have been discussed in detail with the patient. Admission, and post admission procedures and expectations were discussed in detail. All questions were answered. Attending's Name: Dr. Ollie Saldaña DO  Ob/Gyn Resident  3/29/2022, 3:41 AM     Date: 2022  Time: 8:17 AM      Patient Name: Hansa Coleman  Patient : 2000  [facility]  Admission Date/Time: 3/29/2022  3:36 AM        Attending Physician Statement  I have discussed the care of Lisbet Vasquez, including pertinent history and exam findings with the resident. I have reviewed and edited their note in the electronic medical record. The key elements of all parts of the encounter have been performed/reviewed by me . I agree with the assessment, plan and orders as documented by the resident. The level of care submitted represents to the best of my ability the care documented in the medical record today. GC Modifier.   This service has been performed in part by a resident under the direction of a teaching physician.         Attending's Name:  Andrew Guardado, DO

## 2022-03-29 NOTE — FLOWSHEET NOTE
Pt presents with complaints of lower abdominal pain and lower back pain. Pt states she \"started not feeling well yesterday, nausea vomiting, dizziness, abdominal pain that comes for while then goes away for while. \" Pt Denies any loss of fluid, vaginal bleeding, positive fetal movement. VS taken, Doppler  . Resident Dr Nata Florentino notified.

## 2022-04-08 ENCOUNTER — HOSPITAL ENCOUNTER (OUTPATIENT)
Age: 22
Discharge: HOME | End: 2022-04-08
Attending: OBSTETRICS & GYNECOLOGY | Admitting: OBSTETRICS & GYNECOLOGY
Payer: MEDICARE

## 2022-04-08 VITALS
DIASTOLIC BLOOD PRESSURE: 44 MMHG | HEART RATE: 57 BPM | OXYGEN SATURATION: 100 % | SYSTOLIC BLOOD PRESSURE: 112 MMHG | TEMPERATURE: 98.2 F | RESPIRATION RATE: 18 BRPM

## 2022-04-08 PROBLEM — Z3A.23 23 WEEKS GESTATION OF PREGNANCY: Status: ACTIVE | Noted: 2022-04-08

## 2022-04-08 PROBLEM — O09.899 SHORT INTERVAL BETWEEN PREGNANCIES AFFECTING PREGNANCY, ANTEPARTUM: Status: ACTIVE | Noted: 2022-04-08

## 2022-04-08 LAB
-: ABNORMAL
BILIRUBIN URINE: NEGATIVE
CANDIDA SPECIES, DNA PROBE: NEGATIVE
COLOR: YELLOW
EPITHELIAL CELLS UA: ABNORMAL /HPF (ref 0–5)
GARDNERELLA VAGINALIS, DNA PROBE: POSITIVE
GLUCOSE URINE: NEGATIVE
KETONES, URINE: NEGATIVE
LEUKOCYTE ESTERASE, URINE: ABNORMAL
NITRITE, URINE: NEGATIVE
PH UA: 7.5 (ref 5–8)
PROTEIN UA: NEGATIVE
RBC UA: ABNORMAL /HPF (ref 0–4)
SOURCE: ABNORMAL
SPECIFIC GRAVITY UA: 1.01 (ref 1–1.03)
TRICHOMONAS VAGINALIS DNA: NEGATIVE
TURBIDITY: CLEAR
URINE HGB: NEGATIVE
UROBILINOGEN, URINE: NORMAL
WBC UA: ABNORMAL /HPF (ref 0–5)

## 2022-04-08 PROCEDURE — 6370000000 HC RX 637 (ALT 250 FOR IP): Performed by: STUDENT IN AN ORGANIZED HEALTH CARE EDUCATION/TRAINING PROGRAM

## 2022-04-08 PROCEDURE — 87491 CHLMYD TRACH DNA AMP PROBE: CPT

## 2022-04-08 PROCEDURE — 81001 URINALYSIS AUTO W/SCOPE: CPT

## 2022-04-08 PROCEDURE — 87510 GARDNER VAG DNA DIR PROBE: CPT

## 2022-04-08 PROCEDURE — 87591 N.GONORRHOEAE DNA AMP PROB: CPT

## 2022-04-08 PROCEDURE — 87480 CANDIDA DNA DIR PROBE: CPT

## 2022-04-08 PROCEDURE — 87086 URINE CULTURE/COLONY COUNT: CPT

## 2022-04-08 PROCEDURE — 99213 OFFICE O/P EST LOW 20 MIN: CPT

## 2022-04-08 PROCEDURE — 87660 TRICHOMONAS VAGIN DIR PROBE: CPT

## 2022-04-08 RX ORDER — FAMOTIDINE 20 MG/1
20 TABLET, FILM COATED ORAL 2 TIMES DAILY
Status: DISCONTINUED | OUTPATIENT
Start: 2022-04-08 | End: 2022-04-08

## 2022-04-08 RX ORDER — ACETAMINOPHEN 500 MG
1000 TABLET ORAL EVERY 6 HOURS PRN
Status: DISCONTINUED | OUTPATIENT
Start: 2022-04-08 | End: 2022-04-08 | Stop reason: HOSPADM

## 2022-04-08 RX ORDER — ONDANSETRON 4 MG/1
4 TABLET, ORALLY DISINTEGRATING ORAL EVERY 8 HOURS PRN
Status: DISCONTINUED | OUTPATIENT
Start: 2022-04-08 | End: 2022-04-08 | Stop reason: HOSPADM

## 2022-04-08 RX ORDER — FAMOTIDINE 20 MG/1
20 TABLET, FILM COATED ORAL 2 TIMES DAILY
Status: DISCONTINUED | OUTPATIENT
Start: 2022-04-08 | End: 2022-04-08 | Stop reason: HOSPADM

## 2022-04-08 RX ORDER — METRONIDAZOLE 500 MG/1
500 TABLET ORAL 2 TIMES DAILY
Qty: 14 TABLET | Refills: 0 | Status: SHIPPED | OUTPATIENT
Start: 2022-04-08 | End: 2022-04-15

## 2022-04-08 RX ADMIN — ACETAMINOPHEN 1000 MG: 500 TABLET ORAL at 16:35

## 2022-04-08 RX ADMIN — ONDANSETRON 4 MG: 4 TABLET, ORALLY DISINTEGRATING ORAL at 17:29

## 2022-04-08 RX ADMIN — FAMOTIDINE 20 MG: 20 TABLET, FILM COATED ORAL at 18:00

## 2022-04-08 ASSESSMENT — PAIN SCALES - GENERAL: PAINLEVEL_OUTOF10: 6
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
## 2022-04-08 NOTE — FLOWSHEET NOTE
Patient arrives to  via wheelchair from [facility] group home with complaints of cramping and spotting. Patient denies any ROM but reports some of her mucous plug coming out, +FM. Patient to room 702 to avinash et obtain CCUS.

## 2022-04-10 LAB
CULTURE: NORMAL
SPECIMEN DESCRIPTION: NORMAL

## 2022-04-14 ENCOUNTER — HOSPITAL ENCOUNTER (OUTPATIENT)
Age: 22
Discharge: OTHER INSTITUTION | End: 2022-04-14
Attending: OBSTETRICS & GYNECOLOGY | Admitting: OBSTETRICS & GYNECOLOGY
Payer: MEDICARE

## 2022-04-14 VITALS
DIASTOLIC BLOOD PRESSURE: 55 MMHG | RESPIRATION RATE: 18 BRPM | SYSTOLIC BLOOD PRESSURE: 115 MMHG | HEART RATE: 68 BPM | TEMPERATURE: 98.2 F | OXYGEN SATURATION: 99 %

## 2022-04-14 PROBLEM — O09.93 HIGH-RISK PREGNANCY IN THIRD TRIMESTER: Status: ACTIVE | Noted: 2022-04-14

## 2022-04-14 PROCEDURE — 6370000000 HC RX 637 (ALT 250 FOR IP): Performed by: STUDENT IN AN ORGANIZED HEALTH CARE EDUCATION/TRAINING PROGRAM

## 2022-04-14 PROCEDURE — 99213 OFFICE O/P EST LOW 20 MIN: CPT

## 2022-04-14 RX ORDER — METRONIDAZOLE 500 MG/1
500 TABLET ORAL ONCE
Status: COMPLETED | OUTPATIENT
Start: 2022-04-14 | End: 2022-04-14

## 2022-04-14 RX ADMIN — FLUCONAZOLE 150 MG: 100 TABLET ORAL at 23:14

## 2022-04-14 RX ADMIN — METRONIDAZOLE 500 MG: 500 TABLET, FILM COATED ORAL at 23:14
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
## 2022-04-15 NOTE — PROGRESS NOTES
C/o vaginal bleeding, spotting on underwear, denies contractions, c/o some \"white fluid \" coming out, + fetal movement